# Patient Record
Sex: MALE | Race: OTHER
[De-identification: names, ages, dates, MRNs, and addresses within clinical notes are randomized per-mention and may not be internally consistent; named-entity substitution may affect disease eponyms.]

---

## 2018-02-21 ENCOUNTER — HOSPITAL ENCOUNTER (INPATIENT)
Dept: HOSPITAL 72 - EMR | Age: 73
LOS: 5 days | Discharge: NURSING, REHAB OR LONG-TERM CARE | DRG: 481 | End: 2018-02-26
Payer: MEDICARE

## 2018-02-21 VITALS — WEIGHT: 135 LBS | BODY MASS INDEX: 21.69 KG/M2 | HEIGHT: 66 IN

## 2018-02-21 VITALS — SYSTOLIC BLOOD PRESSURE: 91 MMHG | DIASTOLIC BLOOD PRESSURE: 48 MMHG

## 2018-02-21 VITALS — DIASTOLIC BLOOD PRESSURE: 45 MMHG | SYSTOLIC BLOOD PRESSURE: 100 MMHG

## 2018-02-21 VITALS — SYSTOLIC BLOOD PRESSURE: 94 MMHG | DIASTOLIC BLOOD PRESSURE: 45 MMHG

## 2018-02-21 VITALS — SYSTOLIC BLOOD PRESSURE: 94 MMHG | DIASTOLIC BLOOD PRESSURE: 56 MMHG

## 2018-02-21 VITALS — SYSTOLIC BLOOD PRESSURE: 113 MMHG | DIASTOLIC BLOOD PRESSURE: 71 MMHG

## 2018-02-21 VITALS — DIASTOLIC BLOOD PRESSURE: 43 MMHG | SYSTOLIC BLOOD PRESSURE: 80 MMHG

## 2018-02-21 VITALS — SYSTOLIC BLOOD PRESSURE: 87 MMHG | DIASTOLIC BLOOD PRESSURE: 52 MMHG

## 2018-02-21 DIAGNOSIS — Z72.0: ICD-10-CM

## 2018-02-21 DIAGNOSIS — D69.6: ICD-10-CM
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
## 2018-02-21 DIAGNOSIS — D68.9: ICD-10-CM

## 2018-02-21 DIAGNOSIS — S72.141A: Primary | ICD-10-CM

## 2018-02-21 DIAGNOSIS — I25.10: ICD-10-CM

## 2018-02-21 DIAGNOSIS — I95.9: ICD-10-CM

## 2018-02-21 DIAGNOSIS — E78.5: ICD-10-CM

## 2018-02-21 DIAGNOSIS — E86.0: ICD-10-CM

## 2018-02-21 DIAGNOSIS — R55: ICD-10-CM

## 2018-02-21 DIAGNOSIS — N40.0: ICD-10-CM

## 2018-02-21 DIAGNOSIS — K70.9: ICD-10-CM
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
## 2018-02-21 DIAGNOSIS — I73.9: ICD-10-CM

## 2018-02-21 DIAGNOSIS — D62: ICD-10-CM

## 2018-02-21 DIAGNOSIS — I10: ICD-10-CM

## 2018-02-21 DIAGNOSIS — E87.1: ICD-10-CM

## 2018-02-21 DIAGNOSIS — F10.11: ICD-10-CM

## 2018-02-21 DIAGNOSIS — J44.9: ICD-10-CM

## 2018-02-21 DIAGNOSIS — N17.9: ICD-10-CM

## 2018-02-21 DIAGNOSIS — I48.91: ICD-10-CM

## 2018-02-21 DIAGNOSIS — D64.89: ICD-10-CM

## 2018-02-21 LAB
ADD MANUAL DIFF: NO
ALBUMIN SERPL-MCNC: 3.3 G/DL (ref 3.4–5)
ALBUMIN/GLOB SERPL: 0.7 {RATIO} (ref 1–2.7)
ALP SERPL-CCNC: 114 U/L (ref 46–116)
ALT SERPL-CCNC: 15 U/L (ref 12–78)
ANION GAP SERPL CALC-SCNC: 10 MMOL/L (ref 5–15)
AST SERPL-CCNC: 27 U/L (ref 15–37)
BASOPHILS NFR BLD AUTO: 1.6 % (ref 0–2)
BILIRUB SERPL-MCNC: 0.7 MG/DL (ref 0.2–1)
BUN SERPL-MCNC: 36 MG/DL (ref 7–18)
CALCIUM SERPL-MCNC: 9.5 MG/DL (ref 8.5–10.1)
CHLORIDE SERPL-SCNC: 96 MMOL/L (ref 98–107)
CO2 SERPL-SCNC: 25 MMOL/L (ref 21–32)
CREAT SERPL-MCNC: 1.4 MG/DL (ref 0.55–1.3)
EOSINOPHIL NFR BLD AUTO: 6.7 % (ref 0–3)
ERYTHROCYTE [DISTWIDTH] IN BLOOD BY AUTOMATED COUNT: 12.2 % (ref 11.6–14.8)
GLOBULIN SER-MCNC: 5 G/DL
HCT VFR BLD CALC: 42.3 % (ref 42–52)
HGB BLD-MCNC: 14.8 G/DL (ref 14.2–18)
INR PPP: 1.2 (ref 0.9–1.1)
LYMPHOCYTES NFR BLD AUTO: 28.4 % (ref 20–45)
MCV RBC AUTO: 98 FL (ref 80–99)
MONOCYTES NFR BLD AUTO: 13.2 % (ref 1–10)
NEUTROPHILS NFR BLD AUTO: 50.1 % (ref 45–75)
PLATELET # BLD: 111 K/UL (ref 150–450)
POTASSIUM SERPL-SCNC: 4.6 MMOL/L (ref 3.5–5.1)
RBC # BLD AUTO: 4.32 M/UL (ref 4.7–6.1)
SODIUM SERPL-SCNC: 130 MMOL/L (ref 136–145)
WBC # BLD AUTO: 7 K/UL (ref 4.8–10.8)

## 2018-02-21 PROCEDURE — 85025 COMPLETE CBC W/AUTO DIFF WBC: CPT

## 2018-02-21 PROCEDURE — 85610 PROTHROMBIN TIME: CPT

## 2018-02-21 PROCEDURE — 94150 VITAL CAPACITY TEST: CPT

## 2018-02-21 PROCEDURE — 99285 EMERGENCY DEPT VISIT HI MDM: CPT

## 2018-02-21 PROCEDURE — 94760 N-INVAS EAR/PLS OXIMETRY 1: CPT

## 2018-02-21 PROCEDURE — 86850 RBC ANTIBODY SCREEN: CPT

## 2018-02-21 PROCEDURE — 72170 X-RAY EXAM OF PELVIS: CPT

## 2018-02-21 PROCEDURE — 84100 ASSAY OF PHOSPHORUS: CPT

## 2018-02-21 PROCEDURE — 86901 BLOOD TYPING SEROLOGIC RH(D): CPT

## 2018-02-21 PROCEDURE — 87340 HEPATITIS B SURFACE AG IA: CPT

## 2018-02-21 PROCEDURE — 84443 ASSAY THYROID STIM HORMONE: CPT

## 2018-02-21 PROCEDURE — 86709 HEPATITIS A IGM ANTIBODY: CPT

## 2018-02-21 PROCEDURE — 93880 EXTRACRANIAL BILAT STUDY: CPT

## 2018-02-21 PROCEDURE — 93005 ELECTROCARDIOGRAM TRACING: CPT

## 2018-02-21 PROCEDURE — 86803 HEPATITIS C AB TEST: CPT

## 2018-02-21 PROCEDURE — 71045 X-RAY EXAM CHEST 1 VIEW: CPT

## 2018-02-21 PROCEDURE — 80053 COMPREHEN METABOLIC PANEL: CPT

## 2018-02-21 PROCEDURE — 82607 VITAMIN B-12: CPT

## 2018-02-21 PROCEDURE — 94664 DEMO&/EVAL PT USE INHALER: CPT

## 2018-02-21 PROCEDURE — 82306 VITAMIN D 25 HYDROXY: CPT

## 2018-02-21 PROCEDURE — 83735 ASSAY OF MAGNESIUM: CPT

## 2018-02-21 PROCEDURE — 85007 BL SMEAR W/DIFF WBC COUNT: CPT

## 2018-02-21 PROCEDURE — 76700 US EXAM ABDOM COMPLETE: CPT

## 2018-02-21 PROCEDURE — 80048 BASIC METABOLIC PNL TOTAL CA: CPT

## 2018-02-21 PROCEDURE — 86900 BLOOD TYPING SEROLOGIC ABO: CPT

## 2018-02-21 PROCEDURE — 83880 ASSAY OF NATRIURETIC PEPTIDE: CPT

## 2018-02-21 PROCEDURE — 82746 ASSAY OF FOLIC ACID SERUM: CPT

## 2018-02-21 PROCEDURE — 94640 AIRWAY INHALATION TREATMENT: CPT

## 2018-02-21 PROCEDURE — 85044 MANUAL RETICULOCYTE COUNT: CPT

## 2018-02-21 PROCEDURE — 84484 ASSAY OF TROPONIN QUANT: CPT

## 2018-02-21 PROCEDURE — 86703 HIV-1/HIV-2 1 RESULT ANTBDY: CPT

## 2018-02-21 PROCEDURE — 83550 IRON BINDING TEST: CPT

## 2018-02-21 PROCEDURE — 86920 COMPATIBILITY TEST SPIN: CPT

## 2018-02-21 PROCEDURE — 93306 TTE W/DOPPLER COMPLETE: CPT

## 2018-02-21 PROCEDURE — 94003 VENT MGMT INPAT SUBQ DAY: CPT

## 2018-02-21 PROCEDURE — 83540 ASSAY OF IRON: CPT

## 2018-02-21 PROCEDURE — 87081 CULTURE SCREEN ONLY: CPT

## 2018-02-21 PROCEDURE — 86705 HEP B CORE ANTIBODY IGM: CPT

## 2018-02-21 PROCEDURE — 36415 COLL VENOUS BLD VENIPUNCTURE: CPT

## 2018-02-21 PROCEDURE — 82728 ASSAY OF FERRITIN: CPT

## 2018-02-21 RX ADMIN — TAMSULOSIN HYDROCHLORIDE SCH MG: 0.4 CAPSULE ORAL at 23:32

## 2018-02-21 RX ADMIN — DOCUSATE SODIUM SCH MG: 100 CAPSULE, LIQUID FILLED ORAL at 23:32

## 2018-02-21 NOTE — EMERGENCY ROOM REPORT
History of Present Illness


General


Chief Complaint:  Multiple Trauma/Fall


Source:  Patient, EMS





Present Illness


HPI


72YOM with accidental slip and fall in bathroom at [facility]


Landed on right side


Couldnt move, stand up without assistance


EMS noted abrasions to right arm, leg and deformity to right hip


Allergies:  


Coded Allergies:  


     No Known Allergies (Unverified , 2/21/18)





Review of Systems


All Other Systems:  negative except mentioned in HPI





Physical Exam





Vital Signs








  Date Time  Temp Pulse Resp B/P (MAP) Pulse Ox O2 Delivery O2 Flow Rate FiO2


 


2/21/18 17:39 98.0 80 16 110/70 98 Room Air  





 98.1       








Sp02 EP Interpretation:  reviewed, normal


General Appearance:  normal inspection, well appearing, no apparent distress, 

alert, GCS 15, non-toxic


Head:  normocephalic, atraumatic


Eyes:  bilateral eye PERRL, bilateral eye EOMI


ENT:  normal ENT inspection, hearing grossly normal, normal pharynx, no 

angioedema, normal voice, TMs + canals normal, uvula midline, moist mucus 

membranes


Neck:  normal inspection, full range of motion, supple, thyroid normal, no 

meningismus, no bony tend


Respiratory:  normal inspection, lungs clear, normal breath sounds, no rhonchi, 

no respiratory distress, no retraction, no accessory muscle use, no wheezing, 

speaking full sentences


Cardiovascular #1:  regular rate, rhythm, no edema, no JVD, normal capillary 

refill


Gastrointestinal:  normal inspection, normal bowel sounds, non tender, soft, no 

mass, no peritonitis, non-distended, no guarding, no hernia, no pulsatile mass


Genitourinary:  no CVA tenderness


Musculoskeletal:  normal inspection, back normal, normal range of motion, no 

calf tenderness, pelvis stable, Kasia's Sign negative, other - Right hip: 

obvious deformity of proximal hip, shorteend and internally rotated right lower 

extremity


Neurologic:  normal inspection, alert, oriented x3, responsive, CNs III-XII nml 

as tested, motor strength/tone normal, cerebellar normal, normal gait, speech 

normal


Psychiatric:  normal inspection, judgement/insight normal, mood/affect normal, 

no suicidal/homicidal ideation, no delusions


Skin:  other - Abrasions to right elbow and right anterior leg; multiple areas 

of ecchymoses


Lymphatic:  normal inspection, no adenopathy





Procedures


Ultrasound


Ultrasound :  


   Consent:  Verbal


   Patient Tolerated:  Well


   Complications:  None


Progress


Ultrasound guided right femoral nerve block done with 8ml Buvipicaine and 2ml 

lidocaine





Medical Decision Making


Diagnostic Impression:  


 Primary Impression:  


 Fall


 Qualified Codes:  W19.XXXA - Unspecified fall, initial encounter


 Additional Impressions:  


 Right hip pain


 Closed right hip fracture


 Qualified Codes:  S72.001A - Fracture of unspecified part of neck of right 

femur, initial encounter for closed fracture


 SHADY (acute kidney injury)


ER Course


Right hip pain found to have right hip fx on ED review of films


Oral analgesia and right femoral nerve block provided in ED


ECG non-ischemic, Trop 0.


Dr Jenkins consulted


PCP is Dr Ziegler, endorsed to Dr Lira at 740pm for tele bed


Also has ?SHADY on labs, unknown if acute or chronic - possibly acute given mild 

hypotension, hypoNa in ED. Responded to LR.


EKG Diagnostic Results


Rate:  normal


Rhythm:  NSR


ST Segments:  no acute changes


ASA given to the pt in ED:  No





Rhythm Strip Diag. Results


EP Interpretation:  yes


Rate:  65


Rhythm:  NSR, no PVC's, no ectopy





Other X-Ray Diagnostic Results


Other X-Ray Diagnostic Results #1:  


   X-Ray ordered:  AP pelvis


   # of Views/Limited Vs Complete:  1 View


   Indication:  Pain


   Interpretation:  other


   Impression:  Other - Right hip fx


   Electronically Signed by:  Dr Israel Marsh MD


Other X-Ray Diagnostic Results #2:  


   X-Ray ordered:  Right hip


   # of Views/Limited Vs Complete:  3 View


   Indication:  Pain


   Interpretation:  other - Right hip fx


   Electronically Signed by:  Dr Israel Marsh MD





Last Vital Signs








  Date Time  Temp Pulse Resp B/P (MAP) Pulse Ox O2 Delivery O2 Flow Rate FiO2


 


2/21/18 17:39 98.0 80 16 110/70 98 Room Air  





 98.1       








Status:  improved


Disposition:  ADMITTED AS INPATIENT


Condition:  Serious











ISRAEL MARSH M.D. Feb 21, 2018 17:49

## 2018-02-21 NOTE — HISTORY AND PHYSICAL
History of Present Illness


General


Date patient seen:  Feb 21, 2018


Time patient seen:  19:30


Reason for Hospitalization:  R hip fracture, syncope





Present Illness


HPI


71y/o male with pmh of CAD, HTN, PVD, COPD, BPH, tobacco abuse who presents 

with R hip pain after a fall. Pt states he was in bathroom and felt lightheaded 

and the room spinning. He then fell to the floor and had brief LOC. He states 

when he awoke he had full recollection of events. He landed on R side. He couldn

't move or stand up w/o assistance. Denies f/c, n/v, d/c, chest pain, SOB. Pt 

states he can walk several blocks and at least a flight of stairs. He walks on 

own and sometimes uses a cane. Activity limited by leg pain/weakness. 





In ED, pt found to have R hip fracture.


Allergies:  


Coded Allergies:  


     No Known Allergies (Unverified , 2/21/18)





Medication History


Scheduled


Albuterol Sulfate (Ventolin Hfa), 1 PUFF INH EVERY 6 HOURS, (Reported)


Albuterol Sulfate* (Proair Hfa*), 1 PUFF INH Q6H, (Reported)


Aspirin* (Aspir 81*), 81 MG ORAL DAILY, (Reported)


Finasteride* (Proscar*), 5 MG ORAL DAILY, (Reported)


Folic Acid* (Folic Acid*), 1 MG ORAL DAILY, (Reported)


Furosemide* (Lasix*), 80 MG ORAL BID, (Reported)


Pantoprazole* (Pantoprazole*), 40 MG ORAL DAILY, (Reported)


Spironolactone* (Spironolactone*), 100 MG ORAL DAILY, (Reported)


Tamsulosin Hcl (Tamsulosin Hcl*), 0.4 MG ORAL BEDTIME, (Reported)


Trazodone Hcl* (Desyrel*), 50 MG ORAL BEDTIME, (Reported)





Scheduled PRN


Acetaminophen* (Tylenol Extra Strength*), 500 MG ORAL Q6H PRN for Mild Pain/

Temp > 100.5, (Reported)





Miscellaneous Medications


Lactulose (Lactulose), 10 GM PO, (Reported)





Patient History


History Provided By:  Patient, Medical Record, PMD


Healthcare decision maker





Resuscitation status





Advanced Directive on File








Past Medical/Surgical History


Past Medical/Surgical History:  


(1) CAD (coronary artery disease)


(2) HTN (hypertension)


(3) HLD (hyperlipidemia)


(4) BPH (benign prostatic hyperplasia)


(5) COPD (chronic obstructive pulmonary disease)


(6) Tobacco abuse


(7) PVD (peripheral vascular disease)





Family History


Family History:  


Patient reports no known family medical history.





Social History


Social History:  


(1) lives in [facility]





Review of Systems


Constitutional:  Reports: malaise, weakness


Eye:  Reports: no symptoms


ENT:  Reports: no symptoms


Respiratory:  Reports: no symptoms


Cardiovascular:  Reports: syncope


Gastrointestinal:  Reports: no symptoms


Genitourinary:  Reports: no symptoms


Musculoskeletal:  Reports: joint pain, muscle pain


Skin:  Reports: no symptoms


Psychiatric:  Reports: no symptoms


Neurological:  Reports: no symptoms


Endocrine:  Reports: no symptoms


Hematologic/Lymphatic:  Reports: no symptoms





Physical Exam


Physical Exam Narrative


General: alert, cooperative, no distress, appears stated age


Head: normocephalic, without obvious abnormality, atraumatic


Eyes: conjunctivae/corneas clear. PERRL, EOM's intact


Throat: lips, mucosa, and tongue normal. Dry MM


Neck: supple, symmetrical, trachea midline, and no JVD


Lungs: clear to auscultation bilaterally


Heart: regular rate and rhythm, S1, S2 normal, no murmur, click, rub or gallop


Abdomen: soft, non-tender, non-distended, bowel sounds normal


Extremities: +R hip TTP w/ ROM limited 2/2 pain, no cyanosis or edema


Pulses: 2+ and symmetric


Skin: skin color, texture, turgor normal; +abrasions on extremities


Neurologic: grossly normal, no focal deficits





Last 24 Hour Vital Signs








  Date Time  Temp Pulse Resp B/P (MAP) Pulse Ox O2 Delivery O2 Flow Rate FiO2


 


2/21/18 19:54 97.5 74 16 113/71 96 Room Air  





 97.5       


 


2/21/18 19:40 97.5 71 16 100/45 96 Room Air  





 97.5       


 


2/21/18 19:29 97.5 77 16 87/52 96 Room Air  





 97.5       


 


2/21/18 17:48 97.1 71 19 94/56 98 Room Air  





 97.1       


 


2/21/18 17:39 98.0 80 16 110/70 98 Room Air  





 98.1       











Laboratory Tests








Test


  2/21/18


18:00


 


White Blood Count


  7.0 K/UL


(4.8-10.8)


 


Red Blood Count


  4.32 M/UL


(4.70-6.10)  L


 


Hemoglobin


  14.8 G/DL


(14.2-18.0)


 


Hematocrit


  42.3 %


(42.0-52.0)


 


Mean Corpuscular Volume 98 FL (80-99)  


 


Mean Corpuscular Hemoglobin


  34.2 PG


(27.0-31.0)  H


 


Mean Corpuscular Hemoglobin


Concent 34.9 G/DL


(32.0-36.0)


 


Red Cell Distribution Width


  12.2 %


(11.6-14.8)


 


Platelet Count


  111 K/UL


(150-450)  L


 


Mean Platelet Volume


  6.0 FL


(6.5-10.1)  L


 


Neutrophils (%) (Auto)


  50.1 %


(45.0-75.0)


 


Lymphocytes (%) (Auto)


  28.4 %


(20.0-45.0)


 


Monocytes (%) (Auto)


  13.2 %


(1.0-10.0)  H


 


Eosinophils (%) (Auto)


  6.7 %


(0.0-3.0)  H


 


Basophils (%) (Auto)


  1.6 %


(0.0-2.0)


 


Prothrombin Time


  12.3 SEC


(9.30-11.50)  H


 


Prothromb Time International


Ratio 1.2 (0.9-1.1)


H


 


Sodium Level


  130 MMOL/L


(136-145)  L


 


Potassium Level


  4.6 MMOL/L


(3.5-5.1)


 


Chloride Level


  96 MMOL/L


()  L


 


Carbon Dioxide Level


  25 MMOL/L


(21-32)


 


Anion Gap


  10 mmol/L


(5-15)


 


Blood Urea Nitrogen


  36 mg/dL


(7-18)  H


 


Creatinine


  1.4 MG/DL


(0.55-1.30)  H


 


Estimat Glomerular Filtration


Rate  mL/min (>60)  


 


 


Glucose Level


  129 MG/DL


()  H


 


Calcium Level


  9.5 MG/DL


(8.5-10.1)


 


Total Bilirubin


  0.7 MG/DL


(0.2-1.0)


 


Aspartate Amino Transf


(AST/SGOT) 27 U/L (15-37)


 


 


Alanine Aminotransferase


(ALT/SGPT) 15 U/L (12-78)


 


 


Alkaline Phosphatase


  114 U/L


()


 


Troponin I


  0.000 ng/mL


(0.000-0.056)


 


Total Protein


  8.3 G/DL


(6.4-8.2)  H


 


Albumin


  3.3 G/DL


(3.4-5.0)  L


 


Globulin 5.0 g/dL  


 


Albumin/Globulin Ratio


  0.7 (1.0-2.7)


L








Height (Feet):  5


Height (Inches):  6.00


Weight (Pounds):  135


Medications





Current Medications








 Medications


  (Trade)  Dose


 Ordered  Sig/Marleni


 Route


 PRN Reason  Start Time


 Stop Time Status Last Admin


Dose Admin


 


 Acetaminophen


  (Tylenol)  650 mg  Q4H  PRN


 ORAL


 Mild Pain (Pain Scale 1-3)  2/21/18 19:00


 3/23/18 18:59   


 


 


 Acetaminophen


  (Tylenol)  650 mg  Q4H  PRN


 ORAL


 T>100.5  2/21/18 19:00


 3/23/18 18:59   


 


 


 Acetaminophen


  (Tylenol)  650 mg  Q4H  PRN


 RECTAL


 Mild Pain (Pain Scale 1-3)  2/21/18 19:00


 3/23/18 18:59   


 


 


 Acetaminophen


  (Tylenol)  650 mg  Q4H  PRN


 RECTAL


 T>100.5  2/21/18 19:00


 3/23/18 18:59   


 


 


 Aspirin


  (Ecotrin)  81 mg  DAILY


 ORAL


   2/22/18 09:00


 3/24/18 08:59   


 


 


 Bisacodyl


  (Dulcolax)  10 mg  HSPRN  PRN


 RECTAL


 Constipation  2/21/18 19:00


 3/23/18 18:59   


 


 


 Dextrose


  (Dextrose 50%)    STAT  PRN


 IV


 Hypoglycemia  2/21/18 19:00


 3/23/18 18:59   


 


 


 Diphenhydramine


 HCl


  (Benadryl)  25 mg  Q6H  PRN


 ORAL


 Itching/Pruritis  2/21/18 19:00


 3/23/18 18:59   


 


 


 Docusate Sodium


  (Colace)  100 mg  EVERY 12  HOURS


 ORAL


   2/21/18 21:00


 3/23/18 20:59   


 


 


 Folic Acid


  (Folate)  1 mg  DAILY


 ORAL


   2/22/18 09:00


 3/24/18 08:59   


 


 


 Morphine Sulfate


  (Morphine


 Sulfate)  4 mg  Q4H  PRN


 IVP


 breakthrough pain  2/21/18 19:00


 2/28/18 18:59   


 


 


 Ondansetron HCl


  (Zofran)  4 mg  Q6H  PRN


 IVP


 Nausea & Vomiting  2/21/18 19:00


 3/23/18 18:59   


 


 


 Oxycodone/


 Acetaminophen


  (Percocet 10/325)  1 tab  Q4H  PRN


 ORAL


 Severe Pain (Pain Scale 7-10)  2/21/18 19:00


 2/28/18 18:59   


 


 


 Oxycodone/


 Acetaminophen


  (Percocet 5-325)  1 tab  Q4H  PRN


 ORAL


 Moderate Pain (Pain Scale 4-6)  2/21/18 19:00


 2/28/18 18:59   


 


 


 Pantoprazole


  (Protonix)  40 mg  DAILY


 ORAL


   2/22/18 09:00


 3/24/18 08:59   


 


 


 Polyethylene


 Glycol


  (Miralax)  17 gm  HSPRN  PRN


 ORAL


 Constipation  2/21/18 19:00


 3/23/18 18:59   


 


 


 Sodium Chloride  1,000 ml @ 


 75 mls/hr  [id number]


 IV


   2/21/18 19:00


 3/23/18 18:59   


 


 


 Tamsulosin HCl


  (Flomax)  0.4 mg  BEDTIME


 ORAL


   2/21/18 21:00


 3/23/18 20:59   


 


 


 Trazodone HCl


  (Desyrel)  50 mg  HSPRN  PRN


 ORAL


 insomnia  2/21/18 19:00


 3/23/18 18:59   


 











Assessment/Plan


Problem List:  


(1) Fracture of right hip


ICD Codes:  S72.001A - Fracture of unspecified part of neck of right femur, 

initial encounter for closed fracture


SNOMED:  346140465


(2) Syncope


ICD Codes:  R55 - Syncope and collapse


SNOMED:  779311294


(3) SHADY (acute kidney injury)


ICD Codes:  N17.9 - Acute kidney failure, unspecified


SNOMED:  20312854


(4) Hyponatremia


ICD Codes:  E87.1 - Hypo-osmolality and hyponatremia


SNOMED:  99511121


(5) CAD (coronary artery disease)


ICD Codes:  I25.10 - Atherosclerotic heart disease of native coronary artery 

without angina pectoris


SNOMED:  20935238


(6) COPD (chronic obstructive pulmonary disease)


ICD Codes:  J44.9 - Chronic obstructive pulmonary disease, unspecified


SNOMED:  82684650


(7) HTN (hypertension)


ICD Codes:  I10 - Essential (primary) hypertension


SNOMED:  27289273


(8) HLD (hyperlipidemia)


ICD Codes:  E78.5 - Hyperlipidemia, unspecified


SNOMED:  80801183


(9) BPH (benign prostatic hyperplasia)


ICD Codes:  N40.0 - Benign prostatic hyperplasia without lower urinary tract 

symptoms


SNOMED:  744984567


(10) Tobacco abuse


ICD Codes:  Z72.0 - Tobacco use


SNOMED:  747967004


Status:  stable


Assessment/Plan


Labs with evidence of dehydration. Syncope may be 2/2 orthostatic hypotension





Admit to tele


Ortho consulted


NWB RLE


Likely plan for OR for ORIF per ortho in 1-2 days


Pain control, bowel regimen


Cardiology consulted


Trend trop/EKG


Check TTE


Check Carotid duplex


IVFs for SHADY


Cont home meds but hold home lasix and aldactone for now





DVT Prophylaxis:   SCD


Code Status:            Full


Hospital Classification Declaration: Based on this initial evaluation, and 

depending on the patient's clinical course, I anticipate that this patient will 

require hospitalization for 2-3 days for R hip fracture, syncope, SHADY and close 

respiratory/hemodynamic monitoring.





Disposition: Once the patient is stable to leave the hospital, I anticipate the 

patient will likely be discharged to the following environment: back to [facility] vs 

SNF





I spent 70 minutes on this patient's case, and >50% was dedicated to counseling 

and/or care coordination. Discussed with patient/family, nursing staff, SW/CM, 

ER physician, cardiology regarding clinical status, treatment course, and 

disposition planning.





Time of note may not reflect time of encounter.











Pankaj Solo M.D. Feb 21, 2018 22:35

## 2018-02-22 VITALS — DIASTOLIC BLOOD PRESSURE: 54 MMHG | SYSTOLIC BLOOD PRESSURE: 94 MMHG

## 2018-02-22 VITALS — DIASTOLIC BLOOD PRESSURE: 60 MMHG | SYSTOLIC BLOOD PRESSURE: 103 MMHG

## 2018-02-22 VITALS — DIASTOLIC BLOOD PRESSURE: 50 MMHG | SYSTOLIC BLOOD PRESSURE: 95 MMHG

## 2018-02-22 VITALS — SYSTOLIC BLOOD PRESSURE: 120 MMHG | DIASTOLIC BLOOD PRESSURE: 47 MMHG

## 2018-02-22 VITALS — SYSTOLIC BLOOD PRESSURE: 120 MMHG | DIASTOLIC BLOOD PRESSURE: 78 MMHG

## 2018-02-22 VITALS — SYSTOLIC BLOOD PRESSURE: 93 MMHG | DIASTOLIC BLOOD PRESSURE: 55 MMHG

## 2018-02-22 LAB
ADD MANUAL DIFF: NO
ANION GAP SERPL CALC-SCNC: 7 MMOL/L (ref 5–15)
BASOPHILS NFR BLD AUTO: 0.8 % (ref 0–2)
BUN SERPL-MCNC: 37 MG/DL (ref 7–18)
CALCIUM SERPL-MCNC: 8.8 MG/DL (ref 8.5–10.1)
CHLORIDE SERPL-SCNC: 101 MMOL/L (ref 98–107)
CO2 SERPL-SCNC: 25 MMOL/L (ref 21–32)
CREAT SERPL-MCNC: 1.3 MG/DL (ref 0.55–1.3)
EOSINOPHIL NFR BLD AUTO: 1.7 % (ref 0–3)
ERYTHROCYTE [DISTWIDTH] IN BLOOD BY AUTOMATED COUNT: 12.3 % (ref 11.6–14.8)
HCT VFR BLD CALC: 32.2 % (ref 42–52)
HGB BLD-MCNC: 11.4 G/DL (ref 14.2–18)
LYMPHOCYTES NFR BLD AUTO: 24.8 % (ref 20–45)
MCV RBC AUTO: 98 FL (ref 80–99)
MONOCYTES NFR BLD AUTO: 15.5 % (ref 1–10)
NEUTROPHILS NFR BLD AUTO: 57.1 % (ref 45–75)
PLATELET # BLD: 100 K/UL (ref 150–450)
POTASSIUM SERPL-SCNC: 4.8 MMOL/L (ref 3.5–5.1)
RBC # BLD AUTO: 3.28 M/UL (ref 4.7–6.1)
SODIUM SERPL-SCNC: 133 MMOL/L (ref 136–145)
WBC # BLD AUTO: 8.4 K/UL (ref 4.8–10.8)

## 2018-02-22 RX ADMIN — IPRATROPIUM BROMIDE AND ALBUTEROL SULFATE SCH ML: .5; 3 SOLUTION RESPIRATORY (INHALATION) at 19:35

## 2018-02-22 RX ADMIN — DOCUSATE SODIUM SCH MG: 100 CAPSULE, LIQUID FILLED ORAL at 08:48

## 2018-02-22 RX ADMIN — ASPIRIN SCH MG: 81 TABLET, DELAYED RELEASE ORAL at 08:48

## 2018-02-22 RX ADMIN — DOCUSATE SODIUM SCH MG: 100 CAPSULE, LIQUID FILLED ORAL at 21:52

## 2018-02-22 RX ADMIN — TAMSULOSIN HYDROCHLORIDE SCH MG: 0.4 CAPSULE ORAL at 21:53

## 2018-02-22 RX ADMIN — MORPHINE SULFATE PRN MG: 4 INJECTION INTRAVENOUS at 21:52

## 2018-02-22 RX ADMIN — IPRATROPIUM BROMIDE AND ALBUTEROL SULFATE SCH ML: .5; 3 SOLUTION RESPIRATORY (INHALATION) at 13:30

## 2018-02-22 NOTE — GENERAL PROGRESS NOTE
Assessment/Plan


Problem List:  


(1) Fracture of right hip


ICD Codes:  S72.001A - Fracture of unspecified part of neck of right femur, 

initial encounter for closed fracture


SNOMED:  069118004


(2) Syncope


ICD Codes:  R55 - Syncope and collapse


SNOMED:  449439534


(3) SHADY (acute kidney injury)


ICD Codes:  N17.9 - Acute kidney failure, unspecified


SNOMED:  33657327


(4) Hyponatremia


ICD Codes:  E87.1 - Hypo-osmolality and hyponatremia


SNOMED:  45484466


(5) CAD (coronary artery disease)


ICD Codes:  I25.10 - Atherosclerotic heart disease of native coronary artery 

without angina pectoris


SNOMED:  51767907


(6) COPD (chronic obstructive pulmonary disease)


ICD Codes:  J44.9 - Chronic obstructive pulmonary disease, unspecified


SNOMED:  78812796


(7) HTN (hypertension)


ICD Codes:  I10 - Essential (primary) hypertension


SNOMED:  64515957


(8) HLD (hyperlipidemia)


ICD Codes:  E78.5 - Hyperlipidemia, unspecified


SNOMED:  73252479


(9) BPH (benign prostatic hyperplasia)


ICD Codes:  N40.0 - Benign prostatic hyperplasia without lower urinary tract 

symptoms


SNOMED:  162869139


(10) Tobacco abuse


ICD Codes:  Z72.0 - Tobacco use


SNOMED:  447196172


(11) PVD (peripheral vascular disease)


ICD Codes:  I73.9 - Peripheral vascular disease, unspecified


SNOMED:  869087022


(12) Alcoholic liver disease


Assessment & Plan:  Cirrhosis


ICD Codes:  K70.9 - Alcoholic liver disease, unspecified


SNOMED:  94534343


Status:  stable


Status Narrative


Labs with evidence of dehydration. Syncope may be 2/2 orthostatic hypotension





Monitor on tele


Ortho consulted


NWB RLE


Plan for ORIF tomorrow, NPO at [facility]


Pain control, bowel regimen


Cardiology consulted


Trop neg 


Check TTE--EF wnl


Check Carotid duplex


IVFs for SHADY


Cont home meds but hold home lasix and aldactone for now





DVT Prophylaxis:   SCD


Code Status:            Full


Hospital Classification Declaration: Based on this initial evaluation, and 

depending on the patient's clinical course, I anticipate that this patient will 

require hospitalization for 2-3 days for R hip fracture, syncope, SHADY and close 

respiratory/hemodynamic monitoring.





Disposition: Once the patient is stable to leave the hospital, I anticipate the 

patient will likely be discharged to the following environment: back to senior living vs 

SNF


Discussed with patient/family, nursing staff, SW/CM, ER physician, cardiology 

regarding clinical status, treatment course, and disposition planning.





Time of note may not reflect time of encounter.





Subjective


Date patient seen:  Feb 22, 2018


Time patient seen:  11:00


ROS Limited/Unobtainable:  No


Constitutional:  Reports: no symptoms


HEENT:  Reports: no symptoms


Cardiovascular:  Reports: no symptoms


Respiratory:  Reports: no symptoms


Gastrointestinal/Abdominal:  Reports: no symptoms


Genitourinary:  Reports: no symptoms


Neurologic/Psychiatric:  Reports: no symptoms


Endocrine:  Reports: no symptoms


Hematologic/Lymphatic:  Reports: no symptoms


Allergies:  


Coded Allergies:  


     No Known Allergies (Unverified , 2/21/18)


Subjective


No acute o/n events


Awaiting OR for ORIF per ortho





Pain controlled. Denies f/c, n/v, d/c, chest pain, SOB, abd pain





Objective





Last 24 Hour Vital Signs








  Date Time  Temp Pulse Resp B/P (MAP) Pulse Ox O2 Delivery O2 Flow Rate FiO2


 


2/22/18 22:22 97.7       


 


2/22/18 21:52 97.7       


 


2/22/18 20:00 97.7 79 24 93/55 98 Nasal Cannula  





 97.7       


 


2/22/18 19:48  78 18  94 Nasal Cannula 3.0 32


 


2/22/18 19:39      Nasal Cannula 2.0 28


 


2/22/18 19:39  74 18  92 Nasal Cannula 2.0 28


 


2/22/18 19:37     93 Nasal Cannula 2.0 28


 


2/22/18 17:07 97.0       


 


2/22/18 16:08 97.0       


 


2/22/18 16:00 98.1 76 18 94/54 97 Nasal Cannula  





 98.1       


 


2/22/18 16:00  70      


 


2/22/18 15:40  78 22  94 Nasal Cannula 2.0 28


 


2/22/18 15:30  75 22   Nasal Cannula 3.0 32


 


2/22/18 15:30  75 22  98 Nasal Cannula 3.0 32


 


2/22/18 12:00 97.0 71 18 103/60 97 Nasal Cannula  





 97.0       


 


2/22/18 12:00  80      


 


2/22/18 10:00      Nasal Cannula 2.0 28


 


2/22/18 10:00     94 Nasal Cannula 2.0 28


 


2/22/18 08:53 98.2       


 


2/22/18 08:13 98.2 75 17 120/78 94 Nasal Cannula  





 98.2       


 


2/22/18 08:00  86      


 


2/22/18 04:00 98.3 75 21 120/47 94 Nasal Cannula  





 98.3       


 


2/22/18 03:53  84      


 


2/22/18 00:00 97.3 74 23 95/50 95 Nasal Cannula  





 97.3       


 


2/21/18 23:56  71      

















Intake and Output  


 


 2/21/18 2/22/18





 19:00 07:00


 


Intake Total  555 ml


 


Output Total  200 ml


 


Balance  355 ml


 


  


 


Intake Oral  0 ml


 


IV Total  555 ml


 


Output Urine Total  200 ml








Laboratory Tests


2/22/18 06:05: 


White Blood Count 8.4, Red Blood Count 3.28L, Hemoglobin 11.4L, Hematocrit 32.2L

, Mean Corpuscular Volume 98, Mean Corpuscular Hemoglobin 34.7H, Mean 

Corpuscular Hemoglobin Concent 35.3, Red Cell Distribution Width 12.3, Platelet 

Count 100L, Mean Platelet Volume 5.9L, Neutrophils (%) (Auto) 57.1, Lymphocytes 

(%) (Auto) 24.8, Monocytes (%) (Auto) 15.5H, Eosinophils (%) (Auto) 1.7, 

Basophils (%) (Auto) 0.8, Sodium Level 133L, Potassium Level 4.8, Chloride 

Level 101, Carbon Dioxide Level 25, Anion Gap 7, Blood Urea Nitrogen 37H, 

Creatinine 1.3, Estimat Glomerular Filtration Rate , Glucose Level 109H, 

Calcium Level 8.8, Troponin I 0.000, Pro-B-Type Natriuretic Peptide 121, 

Vitamin B12 Level 441, Vitamin D 25-Hydroxy [Pending], 25-Hydroxy Vitamin D2 [

Pending], 25-Hydroxy Vitamin D3 [Pending], Folate 63.6H, Thyroid Stimulating 

Hormone (TSH) 2.306


2/22/18 12:30: Troponin I 0.004


Height (Feet):  5


Height (Inches):  6.00


Weight (Pounds):  135


Objective


General: alert, cooperative, no distress, appears stated age


Head: normocephalic, without obvious abnormality, atraumatic


Eyes: conjunctivae/corneas clear. PERRL, EOM's intact


Throat: lips, mucosa, and tongue normal. Dry MM


Neck: supple, symmetrical, trachea midline, and no JVD


Lungs: clear to auscultation bilaterally


Heart: regular rate and rhythm, S1, S2 normal, no murmur, click, rub or gallop


Abdomen: soft, non-tender, non-distended, bowel sounds normal


Extremities: +R hip TTP w/ ROM limited 2/2 pain, no cyanosis or edema


Pulses: 2+ and symmetric


Skin: skin color, texture, turgor normal; +abrasions on extremities


Neurologic: grossly normal, no focal deficits











Pankaj Solo M.D. Feb 22, 2018 23:01

## 2018-02-22 NOTE — DIAGNOSTIC IMAGING REPORT
Indication: Chest pain

 

Technique: One view of the chest

 

Comparison:

 

Findings: Atelectatic changes are seen in the right lung base. Lungs and pleural

spaces are otherwise clear. Heart size is normal. The aorta is elongated and

calcified.

 

Impression: Basilar atelectasis. No acute process otherwise

## 2018-02-22 NOTE — CARDIAC ELECTROPHYSIOLOGY PN
Subjective


Subjective


[id number]





Objective





Last 24 Hour Vital Signs








  Date Time  Temp Pulse Resp B/P (MAP) Pulse Ox O2 Delivery O2 Flow Rate FiO2


 


2/22/18 16:08 97.0       


 


2/22/18 15:40  78 22  94 Nasal Cannula 2.0 28


 


2/22/18 15:30  75 22   Nasal Cannula 3.0 32


 


2/22/18 15:30  75 22  98 Nasal Cannula 3.0 32


 


2/22/18 12:00 97.0 71 18 103/60 97 Nasal Cannula  





 97.0       


 


2/22/18 12:00  80      


 


2/22/18 10:00      Nasal Cannula 2.0 28


 


2/22/18 10:00     94 Nasal Cannula 2.0 28


 


2/22/18 09:52 98.2       


 


2/22/18 08:53 98.2       


 


2/22/18 08:13 98.2 75 17 120/78 94 Nasal Cannula  





 98.2       


 


2/22/18 08:00  86      


 


2/22/18 04:00 98.3 75 21 120/47 94 Nasal Cannula  





 98.3       


 


2/22/18 03:53  84      


 


2/22/18 00:00 97.3 74 23 95/50 95 Nasal Cannula  





 97.3       


 


2/21/18 23:56  71      


 


2/21/18 21:35 97.5 74 16 91/48 96 Room Air  





 97.5       


 


2/21/18 21:30 97.5 74 16 91/48 96 Room Air  





 97.5       


 


2/21/18 21:20 97.5 74 16 80/43 96 Room Air  





 97.5       


 


2/21/18 21:00 97.5 70 16 94/45 96 Room Air  





 97.5       


 


2/21/18 19:54 97.5 74 16 113/71 96 Room Air  





 97.5       


 


2/21/18 19:40 97.5 71 16 100/45 96 Room Air  





 97.5       


 


2/21/18 19:29 97.5 77 16 87/52 96 Room Air  





 97.5       


 


2/21/18 17:48 97.1 71 19 94/56 98 Room Air  





 97.1       


 


2/21/18 17:39 98.0 80 16 110/70 98 Room Air  





 98.1       

















Intake and Output  


 


 2/21/18 2/22/18





 19:00 07:00


 


Intake Total  555 ml


 


Output Total  200 ml


 


Balance  355 ml


 


  


 


Intake Oral  0 ml


 


IV Total  555 ml


 


Output Urine Total  200 ml











Laboratory Tests








Test


  2/21/18


18:00 2/22/18


06:05 2/22/18


12:30


 


White Blood Count


  7.0 K/UL


(4.8-10.8) 8.4 K/UL


(4.8-10.8) 


 


 


Red Blood Count


  4.32 M/UL


(4.70-6.10)  L 3.28 M/UL


(4.70-6.10)  L 


 


 


Hemoglobin


  14.8 G/DL


(14.2-18.0) 11.4 G/DL


(14.2-18.0)  L 


 


 


Hematocrit


  42.3 %


(42.0-52.0) 32.2 %


(42.0-52.0)  L 


 


 


Mean Corpuscular Volume 98 FL (80-99)   98 FL (80-99)   


 


Mean Corpuscular Hemoglobin


  34.2 PG


(27.0-31.0)  H 34.7 PG


(27.0-31.0)  H 


 


 


Mean Corpuscular Hemoglobin


Concent 34.9 G/DL


(32.0-36.0) 35.3 G/DL


(32.0-36.0) 


 


 


Red Cell Distribution Width


  12.2 %


(11.6-14.8) 12.3 %


(11.6-14.8) 


 


 


Platelet Count


  111 K/UL


(150-450)  L 100 K/UL


(150-450)  L 


 


 


Mean Platelet Volume


  6.0 FL


(6.5-10.1)  L 5.9 FL


(6.5-10.1)  L 


 


 


Neutrophils (%) (Auto)


  50.1 %


(45.0-75.0) 57.1 %


(45.0-75.0) 


 


 


Lymphocytes (%) (Auto)


  28.4 %


(20.0-45.0) 24.8 %


(20.0-45.0) 


 


 


Monocytes (%) (Auto)


  13.2 %


(1.0-10.0)  H 15.5 %


(1.0-10.0)  H 


 


 


Eosinophils (%) (Auto)


  6.7 %


(0.0-3.0)  H 1.7 %


(0.0-3.0) 


 


 


Basophils (%) (Auto)


  1.6 %


(0.0-2.0) 0.8 %


(0.0-2.0) 


 


 


Prothrombin Time


  12.3 SEC


(9.30-11.50)  H 


  


 


 


Prothromb Time International


Ratio 1.2 (0.9-1.1)


H 


  


 


 


Sodium Level


  130 MMOL/L


(136-145)  L 133 MMOL/L


(136-145)  L 


 


 


Potassium Level


  4.6 MMOL/L


(3.5-5.1) 4.8 MMOL/L


(3.5-5.1) 


 


 


Chloride Level


  96 MMOL/L


()  L 101 MMOL/L


() 


 


 


Carbon Dioxide Level


  25 MMOL/L


(21-32) 25 MMOL/L


(21-32) 


 


 


Anion Gap


  10 mmol/L


(5-15) 7 mmol/L


(5-15) 


 


 


Blood Urea Nitrogen


  36 mg/dL


(7-18)  H 37 mg/dL


(7-18)  H 


 


 


Creatinine


  1.4 MG/DL


(0.55-1.30)  H 1.3 MG/DL


(0.55-1.30) 


 


 


Estimat Glomerular Filtration


Rate  mL/min (>60)  


   mL/min (>60)  


  


 


 


Glucose Level


  129 MG/DL


()  H 109 MG/DL


()  H 


 


 


Calcium Level


  9.5 MG/DL


(8.5-10.1) 8.8 MG/DL


(8.5-10.1) 


 


 


Total Bilirubin


  0.7 MG/DL


(0.2-1.0) 


  


 


 


Aspartate Amino Transf


(AST/SGOT) 27 U/L (15-37)


  


  


 


 


Alanine Aminotransferase


(ALT/SGPT) 15 U/L (12-78)


  


  


 


 


Alkaline Phosphatase


  114 U/L


() 


  


 


 


Troponin I


  0.000 ng/mL


(0.000-0.056) 0.000 ng/mL


(0.000-0.056) 0.004 ng/mL


(0.000-0.056)


 


Total Protein


  8.3 G/DL


(6.4-8.2)  H 


  


 


 


Albumin


  3.3 G/DL


(3.4-5.0)  L 


  


 


 


Globulin 5.0 g/dL    


 


Albumin/Globulin Ratio


  0.7 (1.0-2.7)


L 


  


 


 


Pro-B-Type Natriuretic Peptide


  


  121 pg/mL


(0-125) 


 


 


Vitamin B12 Level


  


  441 PG/ML


(193-986) 


 


 


Vitamin D 25-Hydroxy  Pending   


 


25-Hydroxy Vitamin D2  Pending   


 


25-Hydroxy Vitamin D3  Pending   


 


Folate


  


  63.6 NG/ML


(8.6-58.9)  H 


 


 


Thyroid Stimulating Hormone


(TSH) 


  2.306 uiU/mL


(0.358-3.740) 


 

















NOHELIA OREILLY Feb 22, 2018 16:54

## 2018-02-22 NOTE — CONSULTATION
DATE OF CONSULTATION:  02/22/2018



ORTHOPEDIC CONSULTATION



CONSULTING PHYSICIAN:  Mg Charles M.D.



REQUESTING PHYSICIAN:  Pat Ziegler M.D.



REASON FOR CONSULTATION:  Right-sided hip fracture.



BRIEF HISTORY:  The patient is a pleasant 72-year-old gentleman who lives

in a board and care facility, ambulates using a cane.  He states that 2

weeks ago, he fell and had some bruising in his forehead and in his

forearm, however, he was able to walk around and ambulate without

significant difficulty using a cane, however, last night, he was in the

board and care facility and he went to the bathroom and slipped on the

floor and landed on his right side on the hard tile.  He had immediate

onset of pain about the right hip.  He was unable to ambulate.  He was

brought in to [facility] and x-rays revealed a comminuted

peritrochanteric fracture of the right hip.  Orthopedic consultation was

obtained.  The patient was admitted to Dr. Ziegler.  Generally, the

patient is in reasonable health.



PAST MEDICAL HISTORY:  History of hypertension.



PAST SURGICAL HISTORY:  None available.



MEDICATIONS:  Please see chart.



ALLERGIES:  No known drug allergies.



SOCIAL HISTORY:  He lives in a board and care facility and uses a cane to

get around.  He does not smoke or drink excessively.



REVIEW OF SYSTEMS:  Noncontributory.



PHYSICAL EXAMINATION:  Examination of the right hip reveals right leg is

slightly short and externally rotated.  He has got tenderness over the

right peritrochanteric area.  He has got a lot of bruising in his arm and

his legs due to previous falls.  There is no evidence of a large hematoma

today.



X-RAYS:  X-ray of right hip was reviewed.  There is a peritrochanteric

right hip fracture.



IMPRESSION:  Right hip peritrochanteric fracture.



PLAN:  At this time, I had discussion with the patient.  I explained to him

my findings.  I recommend proceeding with right hip open reduction and

internal fixation with a short gamma nail.  Risks, benefits, and

complications of the surgery were discussed with him.  He understands the

risks of surgery and complications associated with it and will like to go

ahead and proceed with it.  All questions were answered.  We will go ahead

and proceed with surgery tomorrow.  We will have him n.p.o. past midnight.

We will do 2D echo and he will be optimized by Medicine prior to

proceeding with surgery.  All questions were answered.









  ______________________________________________

  Mg Charles M.D.





DR:  Jerry

D:  02/22/2018 07:58

T:  02/22/2018 20:28

JOB#:  [id number]

CC:



MIKE

## 2018-02-22 NOTE — ANETHESIA PREOPERATIVE EVAL
Anesthesia Pre-op PMH/ROS


General


Date of Evaluation:  Feb 22, 2018


Time of Evaluation:  16:42


Anesthesiologist:  Lupe


ASA Score:  ASA 3


Mallampati Score


Class I : Soft palate, uvula, fauces, pillars visible


Class II: Soft palate, uvula, fauces visible


Class III: Soft palate, base of uvula visible


Class IV: Only hard plate visible


Mallampati Classification:  Class III


Surgeon:  Araceli


Diagnosis:  R femoral Fx


Surgical Procedure:  ORIF of R femoral Fx.


Anesthesia History:  none


Social History:  smoking - h/o heavy smoking


Family History:  no anesthesia problems


Allergies:  


Coded Allergies:  


     No Known Allergies (Unverified , 2/21/18)





Past Medical History


Cardiovascular:  Reports: HTN, CAD - no recent CP, other - PVD


Pulmonary:  Reports: COPD, 


   Denies: asthma, BRIAN, other


Gastrointestinal/Genitourinary:  Reports: GERD, 


   Denies: CRI, ESRD, other


Neurologic/Psychiatric:  Reports: depression/anxiety, 


   Denies: dementia, CVA, TIA, other


Endocrine:  Denies: DM, hypothyroidism, steroids, other


HEENT:  Denies: cataract (L), cataract (R), glaucoma, [place] (L), [place] (R), other


Hematology/Immune:  Denies: anemia, DVT, bleeding disorder, other


Musculoskeletal/Integumentary:  Reports: DJD, 


   Denies: OA, RA, DDD, edema, other


Other:  other - malnourished


PMH Narrative:


as above , mechanical fall possible syncope


PSxH Narrative:


see H&P





Anesthesia Pre-op Phys. Exam


Physician Exam





Last Vital Signs








  Date Time  Temp Pulse Resp B/P (MAP) Pulse Ox O2 Delivery O2 Flow Rate FiO2


 


2/22/18 16:08 97.0       


 


2/22/18 15:40  78 22  94 Nasal Cannula 2.0 28


 


2/22/18 12:00    103/60    








Constitutional:  NAD


Neurologic:  CN 2-12 intact


Cardiovascular:  no M/R/G


Respiratory:  other - diffuse wheezing bilaterally


Gastrointestinal:  S/NT/ND





Airway Exam


Mallampati Score:  Class III


MO:  limited


Neck:  stiff


ROM:  limited


Teeth:  missing


Dentures:  no upper, no lower





Anesthesia Pre-op A/P


Labs





Hematology








Test


  2/21/18


18:00 2/22/18


06:05


 


White Blood Count


  7.0 K/UL


(4.8-10.8) 8.4 K/UL


(4.8-10.8)


 


Red Blood Count


  4.32 M/UL


(4.70-6.10)  L 3.28 M/UL


(4.70-6.10)  L


 


Hemoglobin


  14.8 G/DL


(14.2-18.0) 11.4 G/DL


(14.2-18.0)  L


 


Hematocrit


  42.3 %


(42.0-52.0) 32.2 %


(42.0-52.0)  L


 


Mean Corpuscular Volume 98 FL (80-99)   98 FL (80-99)  


 


Mean Corpuscular Hemoglobin


  34.2 PG


(27.0-31.0)  H 34.7 PG


(27.0-31.0)  H


 


Mean Corpuscular Hemoglobin


Concent 34.9 G/DL


(32.0-36.0) 35.3 G/DL


(32.0-36.0)


 


Red Cell Distribution Width


  12.2 %


(11.6-14.8) 12.3 %


(11.6-14.8)


 


Platelet Count


  111 K/UL


(150-450)  L 100 K/UL


(150-450)  L


 


Mean Platelet Volume


  6.0 FL


(6.5-10.1)  L 5.9 FL


(6.5-10.1)  L


 


Neutrophils (%) (Auto)


  50.1 %


(45.0-75.0) 57.1 %


(45.0-75.0)


 


Lymphocytes (%) (Auto)


  28.4 %


(20.0-45.0) 24.8 %


(20.0-45.0)


 


Monocytes (%) (Auto)


  13.2 %


(1.0-10.0)  H 15.5 %


(1.0-10.0)  H


 


Eosinophils (%) (Auto)


  6.7 %


(0.0-3.0)  H 1.7 %


(0.0-3.0)


 


Basophils (%) (Auto)


  1.6 %


(0.0-2.0) 0.8 %


(0.0-2.0)








Coagulation








Test


  2/21/18


18:00


 


Prothrombin Time


  12.3 SEC


(9.30-11.50)  H


 


Prothromb Time International


Ratio 1.2 (0.9-1.1)


H








Chemistry








Test


  2/21/18


18:00 2/22/18


06:05 2/22/18


12:30


 


Sodium Level


  130 MMOL/L


(136-145)  L 133 MMOL/L


(136-145)  L 


 


 


Potassium Level


  4.6 MMOL/L


(3.5-5.1) 4.8 MMOL/L


(3.5-5.1) 


 


 


Chloride Level


  96 MMOL/L


()  L 101 MMOL/L


() 


 


 


Carbon Dioxide Level


  25 MMOL/L


(21-32) 25 MMOL/L


(21-32) 


 


 


Anion Gap


  10 mmol/L


(5-15) 7 mmol/L


(5-15) 


 


 


Blood Urea Nitrogen


  36 mg/dL


(7-18)  H 37 mg/dL


(7-18)  H 


 


 


Creatinine


  1.4 MG/DL


(0.55-1.30)  H 1.3 MG/DL


(0.55-1.30) 


 


 


Estimat Glomerular Filtration


Rate  mL/min (>60)  


   mL/min (>60)  


  


 


 


Glucose Level


  129 MG/DL


()  H 109 MG/DL


()  H 


 


 


Calcium Level


  9.5 MG/DL


(8.5-10.1) 8.8 MG/DL


(8.5-10.1) 


 


 


Total Bilirubin


  0.7 MG/DL


(0.2-1.0) 


  


 


 


Aspartate Amino Transf


(AST/SGOT) 27 U/L (15-37)


  


  


 


 


Alanine Aminotransferase


(ALT/SGPT) 15 U/L (12-78)


  


  


 


 


Alkaline Phosphatase


  114 U/L


() 


  


 


 


Troponin I


  0.000 ng/mL


(0.000-0.056) 0.000 ng/mL


(0.000-0.056) 0.004 ng/mL


(0.000-0.056)


 


Total Protein


  8.3 G/DL


(6.4-8.2)  H 


  


 


 


Albumin


  3.3 G/DL


(3.4-5.0)  L 


  


 


 


Globulin 5.0 g/dL    


 


Albumin/Globulin Ratio


  0.7 (1.0-2.7)


L 


  


 


 


Pro-B-Type Natriuretic Peptide


  


  121 pg/mL


(0-125) 


 


 


Vitamin B12 Level


  


  441 PG/ML


(193-986) 


 


 


Vitamin D 25-Hydroxy  Pending   


 


25-Hydroxy Vitamin D2  Pending   


 


25-Hydroxy Vitamin D3  Pending   


 


Folate


  


  63.6 NG/ML


(8.6-58.9)  H 


 


 


Thyroid Stimulating Hormone


(TSH) 


  2.306 uiU/mL


(0.358-3.740) 


 











Studies


Pre-op Studies:  EKG - NSR





Risk Assessment & Plan


Assessment:


ASA 3


Plan:


SAB vs GA





Pre-Antibiotics


Drug:  as scheduled











MARILYNN ALVAREZ M.D. Feb 22, 2018 16:48

## 2018-02-22 NOTE — CONSULTATION
Consult Note


Consult Note


Patient seen/evaluated.


Right hip fx.


Plan on ORIF tomorrow afternoon.


Discussed plan with the patient.


Full consult dictated.











CRIS EASTMAN Feb 22, 2018 07:49

## 2018-02-22 NOTE — DIAGNOSTIC IMAGING REPORT
Indication: Pain, status post fall

 

Technique: One view of the pelvis, 2 views of the right hip

 

Comparison: none

 

Findings: There is a comminuted angulated intertrochanteric fracture of the right

hip. No definite pelvic fracture demonstrated. The joint spaces are preserved.

 

Impression: Positive for right hip intertrochanteric fracture

 

Electronic medical record indicates that patient has been admitted and orthopedic

consult obtained

## 2018-02-22 NOTE — CONSULTATION
DATE OF CONSULTATION:  02/22/2018



CARDIOLOGY CONSULTATION



CONSULTING PHYSICIAN:  Bill Pollard M.D.



REFERRING PHYSICIAN:  Pat Ziegler M.D.



REASON FOR CONSULTATION:  Management of hypertension and preoperative

clearance prior to right hip surgery.



HISTORY OF PRESENT ILLNESS:  The patient is a 72-year-old gentleman with

history of hypertension, COPD, and benign prostatic hypertrophy as well as

questionable myocardial infarction more than 30 years ago.  The patient

also has history of tobacco abuse, who presented to the emergency room

after a fall and right hip pain.  He was in the bathroom and felt

lightheaded and then room was spinning and then fell on the floor, had a

brief loss of consciousness.  He states that when he woke up, he had full

recollection of the events and landed on the right side, could not move or

stand up.  Denies any chest pain, shortness of breath, nausea, vomiting,

or diaphoresis.  The patient has not had any cardiac history in the last

20 years and has not seen a cardiologist.  In the emergency room, the

patient was found to have right hip fracture and was evaluated by Dr. Charles and scheduled for surgery tomorrow.



PAST MEDICAL HISTORY:  As mentioned above.



MEDICATIONS:  Medications at home include aspirin, folic acid, Lasix,

Aldactone, Flomax, and Ventolin.



FAMILY HISTORY:  Noncontributory.



SOCIAL HISTORY:  He lives at home, occasionally smokes, does not drink

alcohol.



REVIEW OF SYSTEMS:  Review of systems was performed and was negative other

than what was mentioned in the history of present illness.



PHYSICAL EXAMINATION:

VITAL SIGNS:  Showed blood pressure of 110/70, pulse 70, respirations 18,

and he is afebrile.

HEAD AND NECK:  Showed no JVD or carotid bruit.

LUNGS:  Clear.

CARDIOVASCULAR:  Shows regular S1 and S2 with no gallop or murmur.

 

ABDOMEN:  Soft.

EXTREMITIES:  Showed no pitting edema.  Right hip is rotated.



LABORATORY AND DIAGNOSTIC DATA:  His labs show white count of 8.4,

hemoglobin 11.4, hematocrit 32.2, and platelet count of 100.  Sodium 132,

potassium is 4.8, BUN of 37, creatinine 1.3, and glucose of 109.  Troponin

negative x2.  EKG shows sinus rhythm.  No acute ST-T wave abnormality and

poor R-wave progression.



ASSESSMENT AND PLAN:

1. Status post fall versus syncope.  The patient states that he has a

good recollection of the event.  He is already ruled out for myocardial

infarction.  His vital signs have been stable.  We will just get an

echocardiogram to evaluate for ejection fraction and wall motion

abnormality.

2. History of asthma, on albuterol.

3. Right hip fracture.  The patient does not have any chest pain or

shortness of breath.  There is no history of congestive heart failure.

The patient is cleared from cardiac standpoint to undergo the right hip

surgery.



Thank you very much for allowing me to participate in the care of this

patient.  Please do not hesitate to contact me for any questions regarding

my evaluation.









  ______________________________________________

  Bill Pollard M.D.





DR:  ROQUE

D:  02/22/2018 16:54

T:  02/22/2018 22:24

JOB#:  [id number]

CC:

## 2018-02-23 VITALS — SYSTOLIC BLOOD PRESSURE: 85 MMHG | DIASTOLIC BLOOD PRESSURE: 41 MMHG

## 2018-02-23 VITALS — DIASTOLIC BLOOD PRESSURE: 42 MMHG | SYSTOLIC BLOOD PRESSURE: 99 MMHG

## 2018-02-23 VITALS — DIASTOLIC BLOOD PRESSURE: 66 MMHG | SYSTOLIC BLOOD PRESSURE: 118 MMHG

## 2018-02-23 VITALS — DIASTOLIC BLOOD PRESSURE: 46 MMHG | SYSTOLIC BLOOD PRESSURE: 100 MMHG

## 2018-02-23 VITALS — DIASTOLIC BLOOD PRESSURE: 47 MMHG | SYSTOLIC BLOOD PRESSURE: 101 MMHG

## 2018-02-23 VITALS — DIASTOLIC BLOOD PRESSURE: 41 MMHG | SYSTOLIC BLOOD PRESSURE: 90 MMHG

## 2018-02-23 VITALS — DIASTOLIC BLOOD PRESSURE: 53 MMHG | SYSTOLIC BLOOD PRESSURE: 93 MMHG

## 2018-02-23 VITALS — DIASTOLIC BLOOD PRESSURE: 52 MMHG | SYSTOLIC BLOOD PRESSURE: 97 MMHG

## 2018-02-23 VITALS — DIASTOLIC BLOOD PRESSURE: 51 MMHG | SYSTOLIC BLOOD PRESSURE: 99 MMHG

## 2018-02-23 VITALS — DIASTOLIC BLOOD PRESSURE: 41 MMHG | SYSTOLIC BLOOD PRESSURE: 92 MMHG

## 2018-02-23 VITALS — DIASTOLIC BLOOD PRESSURE: 40 MMHG | SYSTOLIC BLOOD PRESSURE: 108 MMHG

## 2018-02-23 VITALS — SYSTOLIC BLOOD PRESSURE: 100 MMHG | DIASTOLIC BLOOD PRESSURE: 41 MMHG

## 2018-02-23 VITALS — SYSTOLIC BLOOD PRESSURE: 96 MMHG | DIASTOLIC BLOOD PRESSURE: 40 MMHG

## 2018-02-23 VITALS — DIASTOLIC BLOOD PRESSURE: 58 MMHG | SYSTOLIC BLOOD PRESSURE: 88 MMHG

## 2018-02-23 VITALS — SYSTOLIC BLOOD PRESSURE: 102 MMHG | DIASTOLIC BLOOD PRESSURE: 54 MMHG

## 2018-02-23 VITALS — DIASTOLIC BLOOD PRESSURE: 41 MMHG | SYSTOLIC BLOOD PRESSURE: 98 MMHG

## 2018-02-23 VITALS — DIASTOLIC BLOOD PRESSURE: 42 MMHG | SYSTOLIC BLOOD PRESSURE: 90 MMHG

## 2018-02-23 VITALS — SYSTOLIC BLOOD PRESSURE: 102 MMHG | DIASTOLIC BLOOD PRESSURE: 59 MMHG

## 2018-02-23 VITALS — DIASTOLIC BLOOD PRESSURE: 55 MMHG | SYSTOLIC BLOOD PRESSURE: 111 MMHG

## 2018-02-23 VITALS — SYSTOLIC BLOOD PRESSURE: 100 MMHG | DIASTOLIC BLOOD PRESSURE: 49 MMHG

## 2018-02-23 LAB
% IRON SATURATION: 33 % (ref 15–50)
ADD MANUAL DIFF: YES
ANION GAP SERPL CALC-SCNC: 6 MMOL/L (ref 5–15)
BUN SERPL-MCNC: 29 MG/DL (ref 7–18)
CALCIUM SERPL-MCNC: 8.9 MG/DL (ref 8.5–10.1)
CHLORIDE SERPL-SCNC: 104 MMOL/L (ref 98–107)
CO2 SERPL-SCNC: 25 MMOL/L (ref 21–32)
CREAT SERPL-MCNC: 1.1 MG/DL (ref 0.55–1.3)
ERYTHROCYTE [DISTWIDTH] IN BLOOD BY AUTOMATED COUNT: 12.6 % (ref 11.6–14.8)
FERRITIN SERPL-MCNC: 209 NG/ML (ref 8–388)
HCT VFR BLD CALC: 28.5 % (ref 42–52)
HGB BLD-MCNC: 9.9 G/DL (ref 14.2–18)
IRON SERPL-MCNC: 80 UG/DL (ref 50–175)
MCV RBC AUTO: 100 FL (ref 80–99)
PLATELET # BLD: 88 K/UL (ref 150–450)
POTASSIUM SERPL-SCNC: 4.8 MMOL/L (ref 3.5–5.1)
RBC # BLD AUTO: 2.86 M/UL (ref 4.7–6.1)
SODIUM SERPL-SCNC: 135 MMOL/L (ref 136–145)
TIBC SERPL-MCNC: 245 UG/DL (ref 250–450)
UNSATURATED IRON BINDING: 165 UG/DL (ref 112–346)
WBC # BLD AUTO: 8 K/UL (ref 4.8–10.8)

## 2018-02-23 PROCEDURE — 0QS604Z REPOSITION RIGHT UPPER FEMUR WITH INTERNAL FIXATION DEVICE, OPEN APPROACH: ICD-10-PCS

## 2018-02-23 RX ADMIN — IPRATROPIUM BROMIDE AND ALBUTEROL SULFATE SCH ML: .5; 3 SOLUTION RESPIRATORY (INHALATION) at 11:29

## 2018-02-23 RX ADMIN — IPRATROPIUM BROMIDE AND ALBUTEROL SULFATE SCH ML: .5; 3 SOLUTION RESPIRATORY (INHALATION) at 06:41

## 2018-02-23 RX ADMIN — MORPHINE SULFATE PRN MG: 4 INJECTION INTRAVENOUS at 04:18

## 2018-02-23 RX ADMIN — DOCUSATE SODIUM SCH MG: 100 CAPSULE, LIQUID FILLED ORAL at 08:40

## 2018-02-23 RX ADMIN — ASPIRIN SCH MG: 81 TABLET, DELAYED RELEASE ORAL at 08:40

## 2018-02-23 RX ADMIN — IPRATROPIUM BROMIDE AND ALBUTEROL SULFATE SCH ML: .5; 3 SOLUTION RESPIRATORY (INHALATION) at 02:00

## 2018-02-23 RX ADMIN — MORPHINE SULFATE PRN MG: 4 INJECTION INTRAVENOUS at 10:25

## 2018-02-23 RX ADMIN — IPRATROPIUM BROMIDE AND ALBUTEROL SULFATE SCH ML: .5; 3 SOLUTION RESPIRATORY (INHALATION) at 20:05

## 2018-02-23 RX ADMIN — HYDROCODONE BITARTRATE AND ACETAMINOPHEN PRN TAB: 7.5; 325 TABLET ORAL at 20:57

## 2018-02-23 RX ADMIN — SODIUM CHLORIDE SCH MLS/HR: 0.9 INJECTION INTRAVENOUS at 21:56

## 2018-02-23 NOTE — DIAGNOSTIC IMAGING REPORT
Indication: Right hip fracture, pain, intraoperative imaging

 

Technique: Intraoperative images

 

Comparison: 2/21/2018

 

Findings: Intraoperative images document surgical repair of previously demonstrated

right hip intertrochanteric fracture with medullary nail and compression screw

 

Impression: Intraoperative imaging, as described

## 2018-02-23 NOTE — GENERAL PROGRESS NOTE
Assessment/Plan


Problem List:  


(1) Fracture of right hip


ICD Codes:  S72.001A - Fracture of unspecified part of neck of right femur, 

initial encounter for closed fracture


SNOMED:  361068096


(2) Syncope


ICD Codes:  R55 - Syncope and collapse


SNOMED:  004746320


(3) SHADY (acute kidney injury)


ICD Codes:  N17.9 - Acute kidney failure, unspecified


SNOMED:  62125333


(4) Hyponatremia


ICD Codes:  E87.1 - Hypo-osmolality and hyponatremia


SNOMED:  96300566


(5) CAD (coronary artery disease)


ICD Codes:  I25.10 - Atherosclerotic heart disease of native coronary artery 

without angina pectoris


SNOMED:  76704948


(6) COPD (chronic obstructive pulmonary disease)


ICD Codes:  J44.9 - Chronic obstructive pulmonary disease, unspecified


SNOMED:  90612089


(7) HTN (hypertension)


ICD Codes:  I10 - Essential (primary) hypertension


SNOMED:  30448011


(8) HLD (hyperlipidemia)


ICD Codes:  E78.5 - Hyperlipidemia, unspecified


SNOMED:  27909051


(9) BPH (benign prostatic hyperplasia)


ICD Codes:  N40.0 - Benign prostatic hyperplasia without lower urinary tract 

symptoms


SNOMED:  376609928


(10) Tobacco abuse


ICD Codes:  Z72.0 - Tobacco use


SNOMED:  176985634


(11) PVD (peripheral vascular disease)


ICD Codes:  I73.9 - Peripheral vascular disease, unspecified


SNOMED:  440499160


(12) Alcoholic liver disease


Assessment & Plan:  Cirrhosis


ICD Codes:  K70.9 - Alcoholic liver disease, unspecified


SNOMED:  74625767


(13) Acute blood loss anemia


ICD Codes:  D62 - Acute posthemorrhagic anemia


SNOMED:  465295569


Status:  stable


Assessment/Plan


Labs with evidence of dehydration. Syncope may be 2/2 orthostatic hypotension





Ortho consulted


NWB RLE


Plan for ORIF today, NPO since MN


Pain control, bowel regimen


Cardiology consulted


Trop neg, TTE--EF wnl


Per cardiology, pt is cleared to proceed to OR


F/u carotid duplex


IVFs for SHADY


Cont home meds but hold home lasix and aldactone for now given SHADY





DVT Prophylaxis:   SCD


Code Status:            Full


Hospital Classification Declaration: Based on this initial evaluation, and 

depending on the patient's clinical course, I anticipate that this patient will 

require hospitalization for 1-2 days for R hip fracture, syncope, SHADY and close 

respiratory/hemodynamic monitoring.





Disposition: Once the patient is stable to leave the hospital, I anticipate the 

patient will likely be discharged to the following environment: SNF vs ARU





Discussed with patient/family, nursing staff, SW/CM, ortho, cardiology 

regarding clinical status, treatment course, and disposition planning.





Time of note may not reflect time of encounter.





Subjective


Date patient seen:  Feb 23, 2018


Time patient seen:  11:00


ROS Limited/Unobtainable:  No


Constitutional:  Reports: no symptoms


HEENT:  Reports: no symptoms


Cardiovascular:  Reports: no symptoms


Respiratory:  Reports: no symptoms


Gastrointestinal/Abdominal:  Reports: no symptoms


Genitourinary:  Reports: no symptoms


Neurologic/Psychiatric:  Reports: no symptoms


Endocrine:  Reports: no symptoms


Hematologic/Lymphatic:  Reports: no symptoms


Allergies:  


Coded Allergies:  


     No Known Allergies (Unverified , 2/21/18)


Subjective


No acute o/n events


Awaiting OR for ORIF today per ortho





Pain controlled. Denies f/c, n/v, d/c, chest pain, SOB, abd pain





Objective





Last 24 Hour Vital Signs








  Date Time  Temp Pulse Resp B/P (MAP) Pulse Ox O2 Delivery O2 Flow Rate FiO2


 


2/23/18 20:16  97 18  99 Nasal Cannula 3.0 32


 


2/23/18 20:06  98 16  98 Nasal Cannula 3.0 32


 


2/23/18 20:05     98 Nasal Cannula 3.0 32


 


2/23/18 20:05      Nasal Cannula 3.0 32


 


2/23/18 19:52 97.4 96 18 100/46 98 Nasal Cannula 3.0 





 97.4       


 


2/23/18 18:39 98.8 96 21 102/59 97   





 98.8       


 


2/23/18 18:16 99.5 95 20 108/40 98 Nasal Cannula 3.0 





 99.5       


 


2/23/18 18:09  92 20 96/40 98 Nasal Cannula 3.0 


 


2/23/18 17:50  87 20 93/53 98 Nasal Cannula 3.0 


 


2/23/18 17:45  93 20 88/58 96 Nasal Cannula 3.0 


 


2/23/18 17:30  92 20 118/66 98 Nasal Cannula 3.0 


 


2/23/18 17:13  87 20 90/42 98 Nasal Cannula 3.0 


 


2/23/18 17:00  82 20 85/41 99 Nasal Cannula 3.0 


 


2/23/18 16:45  82 20 90/41 100 Simple Mask 8.0 


 


2/23/18 16:30  82 20 92/41 100 Simple Mask 8.0 


 


2/23/18 16:15  82 20 100/41 100 Simple Mask 8.0 


 


2/23/18 16:00  81 20 99/51 100 Simple Mask 8.0 


 


2/23/18 15:55  79 20 98/41 100 Simple Mask 8.0 


 


2/23/18 15:45  79 20 99/42 100 Simple Mask 8.0 


 


2/23/18 15:40 97.5 80 20 100/49 100 Simple Mask 8.0 





 97.5       


 


2/23/18 12:00 97.3 86 18 102/54 95 Nasal Cannula  





 97.3       


 


2/23/18 12:00  96      


 


2/23/18 11:38  87 18  99 Nasal Cannula 3.0 32


 


2/23/18 11:29  88 16  96 Nasal Cannula 3.0 32


 


2/23/18 10:55 98.7       


 


2/23/18 10:25 98.7       


 


2/23/18 08:00  80      


 


2/23/18 08:00 98.5 84 18 111/55 95 Nasal Cannula  





 98.5       


 


2/23/18 06:51  94 20  95 Nasal Cannula 3.0 32


 


2/23/18 06:41     93 Nasal Cannula 3.0 32


 


2/23/18 06:41      Nasal Cannula 3.0 32


 


2/23/18 06:41  92 18  93 Nasal Cannula 3.0 32


 


2/23/18 04:18 98.7       


 


2/23/18 04:00 98.7 90 23 101/47 93 Nasal Cannula  





 98.7       


 


2/23/18 04:00  82      


 


2/23/18 02:13  84 20  93 Nasal Cannula 3.0 32


 


2/23/18 02:03  90 22  93 Nasal Cannula 3.0 32


 


2/23/18 00:00 98.9 64 20 97/52 91 Nasal Cannula  





 98.9       


 


2/23/18 00:00  74      

















Intake and Output  


 


 2/22/18 2/23/18





 19:00 07:00


 


Intake Total 1790 ml 


 


Output Total 750 ml 500 ml


 


Balance 1040 ml -500 ml


 


  


 


Intake Oral 700 ml 


 


IV Total 1090 ml 


 


Output Urine Total 750 ml 500 ml








Laboratory Tests


2/23/18 06:50: 


White Blood Count 8.0, Red Blood Count 2.86L, Hemoglobin 9.9L, Hematocrit 28.5L

, Mean Corpuscular Volume 100H, Mean Corpuscular Hemoglobin 34.8H, Mean 

Corpuscular Hemoglobin Concent 34.9, Red Cell Distribution Width 12.6, Platelet 

Count 88L, Mean Platelet Volume 5.9L, Neutrophils (%) (Auto) , Lymphocytes (%) (

Auto) , Monocytes (%) (Auto) , Eosinophils (%) (Auto) , Basophils (%) (Auto) , 

Differential Total Cells Counted 100, Neutrophils % (Manual) 56, Lymphocytes % (

Manual) 26, Monocytes % (Manual) 17H, Eosinophils % (Manual) 1, Basophils % (

Manual) 0, Band Neutrophils 0, Platelet Estimate DecreasedL, Platelet 

Morphology Normal, Hypochromasia 2+, Spherocytes 1+, Reticulocyte Count 1.0, 

Sodium Level 135L, Potassium Level 4.8, Chloride Level 104, Carbon Dioxide 

Level 25, Anion Gap 6, Blood Urea Nitrogen 29H, Creatinine 1.1, Estimat 

Glomerular Filtration Rate , Glucose Level 107H, Calcium Level 8.9, Iron Level 

80, Total Iron Binding Capacity 245L, Percent Iron Saturation 33, Unsaturated 

Iron Binding 165, Ferritin 209, Folate 47.5, Hepatitis A IgM Antibody [Pending]

, Hepatitis B Surface Antigen [Pending], Hepatitis B Core IgM Antibody [Pending]

, Hepatitis C Antibody [Pending], HIV (1&2) Antibody Rapid Negative


Height (Feet):  5


Height (Inches):  6.00


Weight (Pounds):  135


Objective


General: alert, cooperative, no distress, appears stated age


Head: normocephalic, without obvious abnormality, atraumatic


Eyes: conjunctivae/corneas clear. PERRL, EOM's intact


Throat: lips, mucosa, and tongue normal. Dry MM


Neck: supple, symmetrical, trachea midline, and no JVD


Lungs: clear to auscultation bilaterally


Heart: regular rate and rhythm, S1, S2 normal, no murmur, click, rub or gallop


Abdomen: soft, non-tender, non-distended, bowel sounds normal


Extremities: +R hip TTP w/ ROM limited 2/2 pain, no cyanosis or edema


Pulses: 2+ and symmetric


Skin: skin color, texture, turgor normal; +abrasions on extremities


Neurologic: grossly normal, no focal deficits











Pankaj Solo M.D. Feb 23, 2018 23:55

## 2018-02-23 NOTE — OPERATIVE NOTE - DICTATED
DATE OF OPERATION:  02/23/2018



PREOPERATIVE DIAGNOSIS:  Right hip intertrochanteric fracture with

comminution.



POSTOPERATIVE:  Right hip intertrochanteric fracture with comminution.



PROCEDURE:  Right hip open reduction and internal fixation using a short

125-degree gamma nail with proximal as well as distal screw fixation.



SURGEON:  Mg Charles M.D.



ASSISTANT:  Katherine Escobedo PA-C.



ANESTHESIOLOGIST:  Dr. Montgomery.



ANESTHESIA:  General LMA anesthesia.



ESTIMATED BLOOD LOSS:  Less than 100 mL.



COMPLICATIONS:  None.



BRIEF HISTORY:  The patient is a pleasant 72-year-old gentleman who was

admitted due to a mechanical fall and right hip fracture.  After full

discussion of risks and benefits of the surgery and complications

associated with it including infection, bleeding, neurovascular

complication, possibility of malunion, possibility of nonunion,

possibility of hardware failure, possibility of hardware pullout, and

other complications that may arise as well as nonunion, malunion, and need

for further surgery and conversion to hemiarthroplasty, he opted for

surgical treatment as described above.



OPERATIVE PROCEDURE:  The patient was brought to the operating room and was

placed on operative table.  All pressure points were well padded.  General

LMA anesthesia was induced.  The patient was placed on a fracture table

and the right leg was placed in traction and the left leg in well-leg

carlos.  All pressure points were well padded.  The right hip was then

reduced and the image was brought in.  Using an image intensifier, the

fracture was reduced near-anatomically.  Once this was completed, the

right hip and right leg was prepped and draped in usual sterile fashion.

Standard 3-cm incision was made proximal to the greater trochanter.  The

gluteal fascia was opened.  The greater trochanter was then palpated and a

guidewire was placed into the greater trochanter into the diaphyseal

region across the fracture site.  The opening reamer was used to open the

entry hole and a 125-degree short gamma nail was then placed in without

any complication.  At this point, a guidepin was placed through the

lateral cortex of the femur into the neck, into the head.  This was

checked on AP and lateral, and was in central-central position.

Measurements were made and 100-mm lag screw appeared to be the right size.

Lag screw was then reamed and placed in without any complications.  At

this point, a locking screw was then locked in on top of the lag screw and

it was backed off by half a turn.  At this point, using a guide, a distal

35-mm screw was placed in for rotational stability.  Once this was

completed, the position of all implants was checked.  The fracture was

reduced anatomically.  The hardware was in excellent position on AP as

well as lateral views.  At this point, all wounds were thoroughly

irrigated using copious amount of fluid.  Gluteal fascia was closed using

#1 Vicryl suture.  Subcutaneous tissue was closed using 2-0 Vicryl suture.

Skin was closed was closed using 3-0 Monocryl suture.  Sterile dressing

was applied.  The patient was taken to recovery room in stable condition.

All lap counts and instrument counts were correct.









  ______________________________________________

  Mg Charles M.D.





DR:  Jerry

D:  02/23/2018 15:23

T:  02/23/2018 20:24

JOB#:  [id number]

CC:

## 2018-02-23 NOTE — ANETHESIA PREOPERATIVE EVAL
Anesthesia Pre-op PMH/ROS


General


Date of Evaluation:  Feb 23, 2018


Time of Evaluation:  13:00


ASA Score:  ASA 4


Mallampati Score


Class I : Soft palate, uvula, fauces, pillars visible


Class II: Soft palate, uvula, fauces visible


Class III: Soft palate, base of uvula visible


Class IV: Only hard plate visible


Mallampati Classification:  Class I


Anesthesia History:  none


Family History:  no anesthesia problems


Allergies:  


Coded Allergies:  


     No Known Allergies (Unverified , 2/21/18)





Past Medical History


Cardiovascular:  Reports: HTN, CAD


Pulmonary:  Reports: COPD


Gastrointestinal/Genitourinary:  Reports: GERD





Anesthesia Pre-op Phys. Exam


Physician Exam





Last Vital Signs








  Date Time  Temp Pulse Resp B/P (MAP) Pulse Ox O2 Delivery O2 Flow Rate FiO2


 


2/23/18 11:38  87 18  99 Nasal Cannula 3.0 32


 


2/23/18 10:55 98.7       


 


2/23/18 08:00    111/55    











Airway Exam


Mallampati Score:  Class III





Anesthesia Pre-op A/P


Labs





Hematology








Test


  2/23/18


06:50


 


White Blood Count


  8.0 K/UL


(4.8-10.8)


 


Red Blood Count


  2.86 M/UL


(4.70-6.10)  L


 


Hemoglobin


  9.9 G/DL


(14.2-18.0)  L


 


Hematocrit


  28.5 %


(42.0-52.0)  L


 


Mean Corpuscular Volume


  100 FL (80-99)


H


 


Mean Corpuscular Hemoglobin


  34.8 PG


(27.0-31.0)  H


 


Mean Corpuscular Hemoglobin


Concent 34.9 G/DL


(32.0-36.0)


 


Red Cell Distribution Width


  12.6 %


(11.6-14.8)


 


Platelet Count


  88 K/UL


(150-450)  L


 


Mean Platelet Volume


  5.9 FL


(6.5-10.1)  L


 


Neutrophils (%) (Auto)


  % (45.0-75.0)


 


 


Lymphocytes (%) (Auto)


  % (20.0-45.0)


 


 


Monocytes (%) (Auto)  % (1.0-10.0)  


 


Eosinophils (%) (Auto)  % (0.0-3.0)  


 


Basophils (%) (Auto)  % (0.0-2.0)  


 


Differential Total Cells


Counted 100  


 


 


Neutrophils % (Manual) 56 % (45-75)  


 


Lymphocytes % (Manual) 26 % (20-45)  


 


Monocytes % (Manual) 17 % (1-10)  H


 


Eosinophils % (Manual) 1 % (0-3)  


 


Basophils % (Manual) 0 % (0-2)  


 


Band Neutrophils 0 % (0-8)  


 


Platelet Estimate Decreased  L


 


Platelet Morphology Normal  


 


Hypochromasia 2+  


 


Spherocytes 1+  








Chemistry








Test


  2/23/18


06:50


 


Sodium Level


  135 MMOL/L


(136-145)  L


 


Potassium Level


  4.8 MMOL/L


(3.5-5.1)


 


Chloride Level


  104 MMOL/L


()


 


Carbon Dioxide Level


  25 MMOL/L


(21-32)


 


Anion Gap


  6 mmol/L


(5-15)


 


Blood Urea Nitrogen


  29 mg/dL


(7-18)  H


 


Creatinine


  1.1 MG/DL


(0.55-1.30)


 


Estimat Glomerular Filtration


Rate  mL/min (>60)  


 


 


Glucose Level


  107 MG/DL


()  H


 


Calcium Level


  8.9 MG/DL


(8.5-10.1)

















Syeda Mann MD Feb 23, 2018 15:07

## 2018-02-23 NOTE — DIAGNOSTIC IMAGING REPORT
Indication: Postoperative, hip fracture

 

Technique: One view of the pelvis

 

Comparison: 2/21/2018

 

Findings: Interim surgical repair of previously demonstrated right hip

intertrochanteric fracture with medullary temitope and compression screw. Retained air

from the surgical exposure is seen within the soft tissues. There is good anatomic

alignment. Incidentally noted is an arterial stent in the left common iliac artery

 

Impression: Postoperative right hip. No unusual features

## 2018-02-23 NOTE — BRIEF OPERATIVE NOTE
Immediate Post Operative Note


Operative Note


Chief Complaint:  rt hip pain


Pre-op Diagnosis:


rt hip IT fracture


Procedure:


Rt hip ORIF


Post-op Diagnosis:  same as pre-op


Findings:  consistent w/pre-op dx studies


Surgeon:  md Araceli


Assistant:  tammy isbell


Anesthesiologist:  md sybil


Anesthesia:  general


Specimen:  none


Complications:  none


Condition:  stable


Fluids:  ns


Estimated Blood Loss:  minimal


Drains:  none


Implant(s) used?:  Yes - MIKO Hernandez Feb 23, 2018 15:35

## 2018-02-23 NOTE — IMMEDIATE POST-OP EVALUATION
Immediate Post-Op Evalulation


Immediate Post-Op Evalulation


Procedure:  r femur orif


Date of Evaluation:  Feb 23, 2018


Time of Evaluation:  15:42


Nausea:  No


Vomiting:  No











Syeda Mann MD Feb 23, 2018 15:42

## 2018-02-23 NOTE — CARDIOLOGY REPORT
--------------- APPROVED REPORT --------------





EXAM: Two-dimensional and M-mode echocardiogram with Doppler and color 

Doppler.



INDICATION

PRE-OP



M-Mode DIMENSIONS 

IVSd1.5 (0.7-1.1cm)Left Atrium (MM)2.8 (1.6-4.0cm)

LVDd5.2 (3.5-5.6cm)Aortic Root4.2 (2.0-3.7cm)

PWd1.7 (0.7-1.1cm)Aortic Cusp Exc.1.9 (1.5-2.0cm)

IVSs2.1 cm

LVDs3.4 (2.5-4.0cm)

PWs1.9 cm





Technically difficult study due to poor acoustical windows.

Normal left ventricular chamber size, systolic function and wall motion to extent 

visualized.

Left ventricular ejection fraction estimated to be  60-65 %.

No evidence of left ventricular hypertrophy.

No evidence of pericardial effusion. 

All other cardiac chamber sizes are within normal.

Focal aortic valve sclerosis with adequate cusp excursion.

Thickened mitral valve leaflets with normal excursion.

Mitral annulus and aortic root calcification.

Pulmonic valve not well visualized.

Normal tricuspid valve structure. 

IVC dilated at 2.3 cm with slight physiologic collapse suggestive of increased RA 

pressure.



A  color flow and spectral Doppler study was performed and revealed:

No aortic regurgitation.

Trace  mitral regurgitation.

Mitral diastolic velocities suggest reduced left ventricular relaxation c/w mild LV 

diastolic dysfunction (Grade I ). 

Trace  tricuspid regurgitation.

No  pulmonic regurgitation present

## 2018-02-23 NOTE — CARDIAC ELECTROPHYSIOLOGY PN
Assessment/Plan


Assessment/Plan


1. Status post fall versus syncope.  He is already ruled out for myocardial


infarction.  His vital signs have been stable.  EF 65%


2. History of asthma, on albuterol.


3. Right hip fracture. S/P ORIF by Dr Edmonds today


No cardiac events 





DW RN and Dr Charles





Subjective


Subjective


Underwent successful Right hip surgery today





Objective





Last 24 Hour Vital Signs








  Date Time  Temp Pulse Resp B/P (MAP) Pulse Ox O2 Delivery O2 Flow Rate FiO2


 


2/23/18 12:00 97.3 86 18 102/54 95 Nasal Cannula  





 97.3       


 


2/23/18 11:38  87 18  99 Nasal Cannula 3.0 32


 


2/23/18 11:29  88 16  96 Nasal Cannula 3.0 32


 


2/23/18 10:55 98.7       


 


2/23/18 10:25 98.7       


 


2/23/18 08:00  80      


 


2/23/18 08:00 98.5 84 18 111/55 95 Nasal Cannula  





 98.5       


 


2/23/18 06:51  94 20  95 Nasal Cannula 3.0 32


 


2/23/18 06:41     93 Nasal Cannula 3.0 32


 


2/23/18 06:41      Nasal Cannula 3.0 32


 


2/23/18 06:41  92 18  93 Nasal Cannula 3.0 32


 


2/23/18 04:18 98.7       


 


2/23/18 04:00 98.7 90 23 101/47 93 Nasal Cannula  





 98.7       


 


2/23/18 04:00  82      


 


2/23/18 02:13  84 20  93 Nasal Cannula 3.0 32


 


2/23/18 02:03  90 22  93 Nasal Cannula 3.0 32


 


2/23/18 00:00 98.9 64 20 97/52 91 Nasal Cannula  





 98.9       


 


2/23/18 00:00  74      


 


2/22/18 21:52 97.7       


 


2/22/18 20:00  85      


 


2/22/18 20:00 97.7 79 24 93/55 98 Nasal Cannula  





 97.7       


 


2/22/18 19:48  78 18  94 Nasal Cannula 3.0 32


 


2/22/18 19:39      Nasal Cannula 2.0 28


 


2/22/18 19:39  74 18  92 Nasal Cannula 2.0 28


 


2/22/18 19:37     93 Nasal Cannula 2.0 28


 


2/22/18 17:07 97.0       


 


2/22/18 16:08 97.0       


 


2/22/18 16:00 98.1 76 18 94/54 97 Nasal Cannula  





 98.1       


 


2/22/18 16:00  70      

















Intake and Output  


 


 2/22/18 2/23/18





 19:00 07:00


 


Intake Total 1790 ml 


 


Output Total 750 ml 500 ml


 


Balance 1040 ml -500 ml


 


  


 


Intake Oral 700 ml 


 


IV Total 1090 ml 


 


Output Urine Total 750 ml 500 ml











Laboratory Tests








Test


  2/23/18


06:50


 


White Blood Count


  8.0 K/UL


(4.8-10.8)


 


Red Blood Count


  2.86 M/UL


(4.70-6.10)  L


 


Hemoglobin


  9.9 G/DL


(14.2-18.0)  L


 


Hematocrit


  28.5 %


(42.0-52.0)  L


 


Mean Corpuscular Volume


  100 FL (80-99)


H


 


Mean Corpuscular Hemoglobin


  34.8 PG


(27.0-31.0)  H


 


Mean Corpuscular Hemoglobin


Concent 34.9 G/DL


(32.0-36.0)


 


Red Cell Distribution Width


  12.6 %


(11.6-14.8)


 


Platelet Count


  88 K/UL


(150-450)  L


 


Mean Platelet Volume


  5.9 FL


(6.5-10.1)  L


 


Neutrophils (%) (Auto)


  % (45.0-75.0)


 


 


Lymphocytes (%) (Auto)


  % (20.0-45.0)


 


 


Monocytes (%) (Auto)  % (1.0-10.0)  


 


Eosinophils (%) (Auto)  % (0.0-3.0)  


 


Basophils (%) (Auto)  % (0.0-2.0)  


 


Differential Total Cells


Counted 100  


 


 


Neutrophils % (Manual) 56 % (45-75)  


 


Lymphocytes % (Manual) 26 % (20-45)  


 


Monocytes % (Manual) 17 % (1-10)  H


 


Eosinophils % (Manual) 1 % (0-3)  


 


Basophils % (Manual) 0 % (0-2)  


 


Band Neutrophils 0 % (0-8)  


 


Platelet Estimate Decreased  L


 


Platelet Morphology Normal  


 


Hypochromasia 2+  


 


Spherocytes 1+  


 


Reticulocyte Count Pending  


 


Sodium Level


  135 MMOL/L


(136-145)  L


 


Potassium Level


  4.8 MMOL/L


(3.5-5.1)


 


Chloride Level


  104 MMOL/L


()


 


Carbon Dioxide Level


  25 MMOL/L


(21-32)


 


Anion Gap


  6 mmol/L


(5-15)


 


Blood Urea Nitrogen


  29 mg/dL


(7-18)  H


 


Creatinine


  1.1 MG/DL


(0.55-1.30)


 


Estimat Glomerular Filtration


Rate  mL/min (>60)  


 


 


Glucose Level


  107 MG/DL


()  H


 


Calcium Level


  8.9 MG/DL


(8.5-10.1)


 


Iron Level Pending  


 


Unsaturated Iron Binding Pending  


 


Ferritin Pending  


 


Folate Pending  


 


Hepatitis A IgM Antibody Pending  


 


Hepatitis B Surface Antigen Pending  


 


Hepatitis B Core IgM Antibody Pending  


 


Hepatitis C Antibody Pending  


 


HIV (1&2) Antibody Rapid Pending  








Objective


HEAD AND NECK:  Showed no JVD or carotid bruit.


LUNGS:  Clear.


CARDIOVASCULAR:  Shows regular S1 and S2 with no gallop or murmur.


 


ABDOMEN:  Soft.


EXTREMITIES:  Showed no pitting edema.  S/P Right hip surgery











NOHELIA OREILLY Feb 23, 2018 15:47

## 2018-02-23 NOTE — PRE-PROCEDURE NOTE/ATTESTATION
Pre-Procedure Note/Attestation


Complete Prior to Procedure


Planned Procedure:  right


Procedure Narrative:


Rt hip ORIF





Indications for Procedure


Pre-Operative Diagnosis:


Rt hip IT fracture





Attestation


I attest that I discussed the nature of the procedure; its benefits; risks and 

complications; and alternatives (and the risks and benefits of such alternatives

), prior to the procedure, with the patient (or the patient's legal 

representative).





I attest that, if there was a reasonable possibility of needing a blood 

transfusion, the patient (or the patient's legal representative) was given the 

[facility] standardized written summary, pursuant 

to the [employer] Blood Safety Act ([place] Health and Safety Code # 1645, as 

amended).





I attest that I re-evaluated the patient just prior to the surgery and that 

there has been no change in the patient's H&P, except as documented below: NONE











CRIS EASTMAN Feb 23, 2018 10:33

## 2018-02-24 VITALS — DIASTOLIC BLOOD PRESSURE: 47 MMHG | SYSTOLIC BLOOD PRESSURE: 99 MMHG

## 2018-02-24 VITALS — DIASTOLIC BLOOD PRESSURE: 55 MMHG | SYSTOLIC BLOOD PRESSURE: 91 MMHG

## 2018-02-24 VITALS — DIASTOLIC BLOOD PRESSURE: 53 MMHG | SYSTOLIC BLOOD PRESSURE: 84 MMHG

## 2018-02-24 VITALS — SYSTOLIC BLOOD PRESSURE: 104 MMHG | DIASTOLIC BLOOD PRESSURE: 56 MMHG

## 2018-02-24 VITALS — SYSTOLIC BLOOD PRESSURE: 99 MMHG | DIASTOLIC BLOOD PRESSURE: 51 MMHG

## 2018-02-24 VITALS — DIASTOLIC BLOOD PRESSURE: 51 MMHG | SYSTOLIC BLOOD PRESSURE: 98 MMHG

## 2018-02-24 VITALS — DIASTOLIC BLOOD PRESSURE: 51 MMHG | SYSTOLIC BLOOD PRESSURE: 105 MMHG

## 2018-02-24 VITALS — SYSTOLIC BLOOD PRESSURE: 88 MMHG | DIASTOLIC BLOOD PRESSURE: 47 MMHG

## 2018-02-24 VITALS — DIASTOLIC BLOOD PRESSURE: 74 MMHG | SYSTOLIC BLOOD PRESSURE: 109 MMHG

## 2018-02-24 LAB
ADD MANUAL DIFF: YES
ANION GAP SERPL CALC-SCNC: 6 MMOL/L (ref 5–15)
ANION GAP SERPL CALC-SCNC: 7 MMOL/L (ref 5–15)
BUN SERPL-MCNC: 24 MG/DL (ref 7–18)
BUN SERPL-MCNC: 25 MG/DL (ref 7–18)
CALCIUM SERPL-MCNC: 8.1 MG/DL (ref 8.5–10.1)
CALCIUM SERPL-MCNC: 8.1 MG/DL (ref 8.5–10.1)
CHLORIDE SERPL-SCNC: 106 MMOL/L (ref 98–107)
CHLORIDE SERPL-SCNC: 106 MMOL/L (ref 98–107)
CO2 SERPL-SCNC: 23 MMOL/L (ref 21–32)
CO2 SERPL-SCNC: 23 MMOL/L (ref 21–32)
CREAT SERPL-MCNC: 1 MG/DL (ref 0.55–1.3)
CREAT SERPL-MCNC: 1 MG/DL (ref 0.55–1.3)
ERYTHROCYTE [DISTWIDTH] IN BLOOD BY AUTOMATED COUNT: 12.3 % (ref 11.6–14.8)
HCT VFR BLD CALC: 20.4 % (ref 42–52)
HGB BLD-MCNC: 7.2 G/DL (ref 14.2–18)
MCV RBC AUTO: 100 FL (ref 80–99)
PHOSPHATE SERPL-MCNC: 2.7 MG/DL (ref 2.5–4.9)
PLATELET # BLD: 92 K/UL (ref 150–450)
POTASSIUM SERPL-SCNC: 4.3 MMOL/L (ref 3.5–5.1)
POTASSIUM SERPL-SCNC: 4.4 MMOL/L (ref 3.5–5.1)
RBC # BLD AUTO: 2.05 M/UL (ref 4.7–6.1)
SODIUM SERPL-SCNC: 135 MMOL/L (ref 136–145)
SODIUM SERPL-SCNC: 136 MMOL/L (ref 136–145)
WBC # BLD AUTO: 8.6 K/UL (ref 4.8–10.8)

## 2018-02-24 RX ADMIN — IPRATROPIUM BROMIDE AND ALBUTEROL SULFATE SCH ML: .5; 3 SOLUTION RESPIRATORY (INHALATION) at 01:40

## 2018-02-24 RX ADMIN — DOCUSATE SODIUM SCH MG: 100 CAPSULE, LIQUID FILLED ORAL at 12:54

## 2018-02-24 RX ADMIN — IPRATROPIUM BROMIDE AND ALBUTEROL SULFATE SCH ML: .5; 3 SOLUTION RESPIRATORY (INHALATION) at 12:47

## 2018-02-24 RX ADMIN — IPRATROPIUM BROMIDE AND ALBUTEROL SULFATE SCH ML: .5; 3 SOLUTION RESPIRATORY (INHALATION) at 19:00

## 2018-02-24 RX ADMIN — HYDROCODONE BITARTRATE AND ACETAMINOPHEN PRN TAB: 7.5; 325 TABLET ORAL at 08:37

## 2018-02-24 RX ADMIN — DOCUSATE SODIUM SCH MG: 100 CAPSULE, LIQUID FILLED ORAL at 18:14

## 2018-02-24 RX ADMIN — IPRATROPIUM BROMIDE AND ALBUTEROL SULFATE SCH ML: .5; 3 SOLUTION RESPIRATORY (INHALATION) at 07:16

## 2018-02-24 RX ADMIN — SODIUM CHLORIDE SCH MLS/HR: 0.9 INJECTION INTRAVENOUS at 06:00

## 2018-02-24 RX ADMIN — DOCUSATE SODIUM SCH MG: 100 CAPSULE, LIQUID FILLED ORAL at 08:37

## 2018-02-24 RX ADMIN — TAMSULOSIN HYDROCHLORIDE SCH MG: 0.4 CAPSULE ORAL at 21:02

## 2018-02-24 NOTE — DIAGNOSTIC IMAGING REPORT
Indication: Abdominal pain

 

Technique: Ultrasound of the abdomen.

 

Comparison: None

 

Findings: Study is suboptimal.

 

Liver: A large portion of the liver is grossly normal. Much of the liver is obscured

including the dome.

Gallbladder: Not fully distended. Gallbladder wall is difficult to measure but may be

minimally thickened.

Common bile duct: Not well-visualized.

Pancreas: The visualized portion of pancreas is normal in echogenicity. There are no

masses. The distal body and tail are obscured.

Kidneys: The kidneys are normal in size and echogenicity. There is no hydronephrosis.

Spleen: The spleen is normal in size and echogenicity.

Aorta: The visualized portion of the aorta is normal in caliber. The distal aorta is

obscured.

IVC: The demonstrated portion of the inferior vena cava is normal.

 

 

 

 

Impression: Suboptimal examination.

 

No gross abnormality in the visualized structures.

## 2018-02-24 NOTE — ORTHOPEDIC PROGRESS NOTE
Orthopedic - Progress Note


Subjective


Symptoms:  c/o post-op hip pain, other - hypotensive and low H&H. transfered to 

[facility]. prbcs given





Objective


Vital Signs





Laboratory Tests








Test


  2/24/18


06:40


 


White Blood Count


  8.6 K/UL


(4.8-10.8)


 


Red Blood Count


  2.05 M/UL


(4.70-6.10)  L


 


Hemoglobin


  7.2 G/DL


(14.2-18.0)  L


 


Hematocrit


  20.4 %


(42.0-52.0)  L


 


Mean Corpuscular Volume


  100 FL (80-99)


H


 


Mean Corpuscular Hemoglobin


  34.9 PG


(27.0-31.0)  H


 


Mean Corpuscular Hemoglobin


Concent 35.1 G/DL


(32.0-36.0)


 


Red Cell Distribution Width


  12.3 %


(11.6-14.8)


 


Platelet Count


  92 K/UL


(150-450)  L


 


Mean Platelet Volume


  6.0 FL


(6.5-10.1)  L


 


Neutrophils (%) (Auto)


  % (45.0-75.0)


 


 


Lymphocytes (%) (Auto)


  % (20.0-45.0)


 


 


Monocytes (%) (Auto)  % (1.0-10.0)  


 


Eosinophils (%) (Auto)  % (0.0-3.0)  


 


Basophils (%) (Auto)  % (0.0-2.0)  


 


Differential Total Cells


Counted 100  


 


 


Neutrophils % (Manual) 60 % (45-75)  


 


Lymphocytes % (Manual) 25 % (20-45)  


 


Monocytes % (Manual) 12 % (1-10)  H


 


Eosinophils % (Manual) 3 % (0-3)  


 


Basophils % (Manual) 0 % (0-2)  


 


Band Neutrophils 0 % (0-8)  


 


Platelet Estimate Decreased  L


 


Platelet Morphology Normal  


 


Hypochromasia 1+  


 


Macrocytosis 1+  


 


Sodium Level


  136 MMOL/L


(136-145)


 


Potassium Level


  4.4 MMOL/L


(3.5-5.1)


 


Chloride Level


  106 MMOL/L


()


 


Carbon Dioxide Level


  23 MMOL/L


(21-32)


 


Anion Gap


  7 mmol/L


(5-15)


 


Blood Urea Nitrogen


  25 mg/dL


(7-18)  H


 


Creatinine


  1.0 MG/DL


(0.55-1.30)


 


Estimat Glomerular Filtration


Rate  mL/min (>60)  


 


 


Glucose Level


  124 MG/DL


()  H


 


Calcium Level


  8.1 MG/DL


(8.5-10.1)  L


 


Phosphorus Level


  2.7 MG/DL


(2.5-4.9)


 


Magnesium Level


  1.9 MG/DL


(1.8-2.4)








Last 24 Hour Vital Signs








  Date Time  Temp Pulse Resp B/P (MAP) Pulse Ox O2 Delivery O2 Flow Rate FiO2


 


2/24/18 14:56  97 18 98/51 98 Nasal Cannula 3.0 


 


2/24/18 12:58  92 20  99 Nasal Cannula 3.0 32


 


2/24/18 12:47  90 18  98 Nasal Cannula 3.0 32


 


2/24/18 12:00 98.5 98 17 99/47 98 Nasal Cannula 3.0 





 98.5       


 


2/24/18 10:30 97.9 98  88/47    





 97.9       


 


2/24/18 10:01 98.3       


 


2/24/18 08:37 98.3       


 


2/24/18 08:00 98.3 99 18 91/55 98 Nasal Cannula 3.0 





 98.3       


 


2/24/18 07:26  90 18  99 Nasal Cannula 3.0 32


 


2/24/18 07:20   20 84/53 97 Nasal Cannula 3.0 


 


2/24/18 07:16      Nasal Cannula 3.0 32


 


2/24/18 07:16     99 Nasal Cannula 3.0 32


 


2/24/18 07:16  91 16  99 Nasal Cannula 3.0 32


 


2/24/18 03:24 97.8 103 16 105/51 98 Nasal Cannula 3.0 





 97.8       


 


2/24/18 01:48  99 18  99 Nasal Cannula 3.0 32


 


2/24/18 01:41  102 16  98 Nasal Cannula 3.0 32


 


2/24/18 00:16 98.0 103 17 99/51 95 Nasal Cannula 3.0 





 98.0       


 


2/23/18 20:16  97 18  99 Nasal Cannula 3.0 32


 


2/23/18 20:06  98 16  98 Nasal Cannula 3.0 32


 


2/23/18 20:05     98 Nasal Cannula 3.0 32


 


2/23/18 20:05      Nasal Cannula 3.0 32


 


2/23/18 19:52 97.4 96 18 100/46 98 Nasal Cannula 3.0 





 97.4       


 


2/23/18 18:39 98.8 96 21 102/59 97   





 98.8       


 


2/23/18 18:16 99.5 95 20 108/40 98 Nasal Cannula 3.0 





 99.5       


 


2/23/18 18:09  92 20 96/40 98 Nasal Cannula 3.0 


 


2/23/18 17:50  87 20 93/53 98 Nasal Cannula 3.0 


 


2/23/18 17:45  93 20 88/58 96 Nasal Cannula 3.0 


 


2/23/18 17:30  92 20 118/66 98 Nasal Cannula 3.0 


 


2/23/18 17:13  87 20 90/42 98 Nasal Cannula 3.0 


 


2/23/18 17:00  82 20 85/41 99 Nasal Cannula 3.0 


 


2/23/18 16:45  82 20 90/41 100 Simple Mask 8.0 








I&O











Intake and Output  


 


 2/23/18 2/24/18





 19:00 07:00


 


Intake Total 1900 ml 300 ml


 


Output Total 1800 ml 750 ml


 


Balance 100 ml -450 ml


 


  


 


Intake Oral  300 ml


 


IV Total 1900 ml 


 


Output Urine Total 1800 ml 750 ml








Wound:  clean, dry, intact


Drains:  none


Neuro Status:  normal


Vascular Status:  normal


Additional Comments


xray reviewed





Assessment


Post-op Diagnosis


POD 1


Procedure Performed


Rt hip ORIF





Plan


Plan:  PT, pain management, discharge plan - likely to snf once stable. f/u dr grande 2 weeks, other - monitor H&H. transfuse as needed











MIKO CORTEZ Feb 24, 2018 16:44

## 2018-02-24 NOTE — GENERAL PROGRESS NOTE
Assessment/Plan


Problem List:  


(1) Acute blood loss anemia


ICD Codes:  D62 - Acute posthemorrhagic anemia


SNOMED:  972632107


(2) SHADY (acute kidney injury)


ICD Codes:  N17.9 - Acute kidney failure, unspecified


SNOMED:  16561726


(3) Fall


ICD Codes:  W19.XXXA - Unspecified fall, initial encounter


SNOMED:  0039810, 758340727


Qualifiers:  


   Qualified Codes:  W19.XXXA - Unspecified fall, initial encounter


(4) Right hip pain


ICD Codes:  M25.551 - Pain in right hip


SNOMED:  40517166


(5) Closed right hip fracture


ICD Codes:  S72.001A - Fracture of unspecified part of neck of right femur, 

initial encounter for closed fracture


SNOMED:  651417733


Qualifiers:  


   Qualified Codes:  S72.001A - Fracture of unspecified part of neck of right 

femur, initial encounter for closed fracture


(6) COPD (chronic obstructive pulmonary disease)


ICD Codes:  J44.9 - Chronic obstructive pulmonary disease, unspecified


SNOMED:  92428835


(7) CAD (coronary artery disease)


ICD Codes:  I25.10 - Atherosclerotic heart disease of native coronary artery 

without angina pectoris


SNOMED:  87385964


(8) Tobacco abuse


ICD Codes:  Z72.0 - Tobacco use


SNOMED:  130796899


(9) HTN (hypertension)


ICD Codes:  I10 - Essential (primary) hypertension


SNOMED:  74928664


(10) HLD (hyperlipidemia)


ICD Codes:  E78.5 - Hyperlipidemia, unspecified


SNOMED:  12548860


(11) BPH (benign prostatic hyperplasia)


ICD Codes:  N40.0 - Benign prostatic hyperplasia without lower urinary tract 

symptoms


SNOMED:  604262302


(12) Fracture of right hip


ICD Codes:  S72.001A - Fracture of unspecified part of neck of right femur, 

initial encounter for closed fracture


SNOMED:  526925618


(13) Syncope


ICD Codes:  R55 - Syncope and collapse


SNOMED:  197096395


(14) PVD (peripheral vascular disease)


ICD Codes:  I73.9 - Peripheral vascular disease, unspecified


SNOMED:  649893209


(15) Hyponatremia


ICD Codes:  E87.1 - Hypo-osmolality and hyponatremia


SNOMED:  43168781


(16) Alcoholic liver disease


ICD Codes:  K70.9 - Alcoholic liver disease, unspecified


SNOMED:  62987945


(17) lives in prison


Status:  stable


Assessment/Plan


Labs with evidence of dehydration. Syncope may be 2/2 orthostatic hypotension





1 L NS bolus STAT ordered for hypotension and Hgb 7.2. Transfuse prn Hgb < 7 or 

symptomatic 


s/p R ORIF, POD#1


lovenox 30mg subq x 10 days 


Ortho consulted


NWB RLE


PT eval and treat


Pain control, bowel regimen


Cardiology consulted


Trop neg, TTE--EF wnl 65%


Per cardiology, pt is cleared to proceed to OR


F/u carotid duplex


F/u abdominal U/S 


IVFs for SHADY


Cont home meds but hold home lasix and aldactone for now given SHADY





DVT Prophylaxis:   SCD


Code Status:            Full


Hospital Classification Declaration: Based on this initial evaluation, and 

depending on the patient's clinical course, I anticipate that this patient will 

require hospitalization for 1-2 days for R hip fracture, syncope, SHADY and close 

respiratory/hemodynamic monitoring.





Disposition: Once the patient is stable to leave the hospital, I anticipate the 

patient will likely be discharged to the following environment: SNF vs ARU





Discussed with patient/family, nursing staff, SW/CM, ortho, cardiology 

regarding clinical status, treatment course, and disposition planning.





Time of note may not reflect time of encounter.





Subjective


Date patient seen:  Feb 24, 2018


Allergies:  


Coded Allergies:  


     No Known Allergies (Unverified , 2/21/18)


Subjective


- s/p right ORIF POD#1


- hypotensive to systolic 90s today, Hgb dropped to 7.2. denies dizziness, 

chest pain, sob, palpitations, n/v, abdominal pain


- pain well controlled 


- denies flatus


- afebrile





Objective





Last 24 Hour Vital Signs








  Date Time  Temp Pulse Resp B/P (MAP) Pulse Ox O2 Delivery O2 Flow Rate FiO2


 


2/24/18 10:01 98.3       


 


2/24/18 08:37 98.3       


 


2/24/18 08:00 98.3 99 18 91/55 98 Nasal Cannula 3.0 





 98.3       


 


2/24/18 07:26  90 18  99 Nasal Cannula 3.0 32


 


2/24/18 07:20   20 84/53 97 Nasal Cannula 3.0 


 


2/24/18 07:16      Nasal Cannula 3.0 32


 


2/24/18 07:16     99 Nasal Cannula 3.0 32


 


2/24/18 07:16  91 16  99 Nasal Cannula 3.0 32


 


2/24/18 03:24 97.8 103 16 105/51 98 Nasal Cannula 3.0 





 97.8       


 


2/24/18 01:48  99 18  99 Nasal Cannula 3.0 32


 


2/24/18 01:41  102 16  98 Nasal Cannula 3.0 32


 


2/24/18 00:16 98.0 103 17 99/51 95 Nasal Cannula 3.0 





 98.0       


 


2/23/18 20:16  97 18  99 Nasal Cannula 3.0 32


 


2/23/18 20:06  98 16  98 Nasal Cannula 3.0 32


 


2/23/18 20:05     98 Nasal Cannula 3.0 32


 


2/23/18 20:05      Nasal Cannula 3.0 32


 


2/23/18 19:52 97.4 96 18 100/46 98 Nasal Cannula 3.0 





 97.4       


 


2/23/18 18:39 98.8 96 21 102/59 97   





 98.8       


 


2/23/18 18:16 99.5 95 20 108/40 98 Nasal Cannula 3.0 





 99.5       


 


2/23/18 18:09  92 20 96/40 98 Nasal Cannula 3.0 


 


2/23/18 17:50  87 20 93/53 98 Nasal Cannula 3.0 


 


2/23/18 17:45  93 20 88/58 96 Nasal Cannula 3.0 


 


2/23/18 17:30  92 20 118/66 98 Nasal Cannula 3.0 


 


2/23/18 17:13  87 20 90/42 98 Nasal Cannula 3.0 


 


2/23/18 17:00  82 20 85/41 99 Nasal Cannula 3.0 


 


2/23/18 16:45  82 20 90/41 100 Simple Mask 8.0 


 


2/23/18 16:30  82 20 92/41 100 Simple Mask 8.0 


 


2/23/18 16:15  82 20 100/41 100 Simple Mask 8.0 


 


2/23/18 16:00  81 20 99/51 100 Simple Mask 8.0 


 


2/23/18 15:55  79 20 98/41 100 Simple Mask 8.0 


 


2/23/18 15:45  79 20 99/42 100 Simple Mask 8.0 


 


2/23/18 15:40 97.5 80 20 100/49 100 Simple Mask 8.0 





 97.5       

















Intake and Output  


 


 2/23/18 2/24/18





 19:00 07:00


 


Intake Total 1900 ml 300 ml


 


Output Total 1800 ml 750 ml


 


Balance 100 ml -450 ml


 


  


 


Intake Oral  300 ml


 


IV Total 1900 ml 


 


Output Urine Total 1800 ml 750 ml








Laboratory Tests


2/24/18 06:40: 


White Blood Count 8.6, Red Blood Count 2.05L, Hemoglobin 7.2L, Hematocrit 20.4L

, Mean Corpuscular Volume 100H, Mean Corpuscular Hemoglobin 34.9H, Mean 

Corpuscular Hemoglobin Concent 35.1, Red Cell Distribution Width 12.3, Platelet 

Count 92L, Mean Platelet Volume 6.0L, Neutrophils (%) (Auto) , Lymphocytes (%) (

Auto) , Monocytes (%) (Auto) , Eosinophils (%) (Auto) , Basophils (%) (Auto) , 

Differential Total Cells Counted 100, Neutrophils % (Manual) 60, Lymphocytes % (

Manual) 25, Monocytes % (Manual) 12H, Eosinophils % (Manual) 3, Basophils % (

Manual) 0, Band Neutrophils 0, Platelet Estimate DecreasedL, Platelet 

Morphology Normal, Hypochromasia 1+, Macrocytosis 1+, Sodium Level 136, 

Potassium Level 4.4, Chloride Level 106, Carbon Dioxide Level 23, Anion Gap 7, 

Blood Urea Nitrogen 25H, Creatinine 1.0, Estimat Glomerular Filtration Rate , 

Glucose Level 124H, Calcium Level 8.1L, Phosphorus Level 2.7, Magnesium Level 

1.9


Height (Feet):  5


Height (Inches):  6.00


Weight (Pounds):  135


General Appearance:  no apparent distress, alert


EENT:  PERRL/EOMI, normal ENT inspection


Neck:  non-tender, normal alignment, supple


Cardiovascular:  normal peripheral pulses, normal rate, regular rhythm


Respiratory/Chest:  chest wall non-tender, lungs clear, normal breath sounds


Abdomen:  normal bowel sounds, non tender, soft


Pelvis:  other - right hip dressing


Extremities:  normal range of motion, non-tender


Neurologic:  CNs II-XII grossly normal, no motor/sensory deficits, alert, 

oriented x 3


Skin:  warm/dry











Janet Win N.P. Feb 24, 2018 12:18

## 2018-02-24 NOTE — CARDIAC ELECTROPHYSIOLOGY PN
Assessment/Plan


Assessment/Plan


1. Status post fall versus syncope. Ruled out for myocardial infarction.   EF 65

%


2. History of asthma, on albuterol.


3. Right hip fracture. S/P ORIF by Dr Edmonds. No cardiac events 


4. Hypotension. Lowest 88. Got better with IV fluid now 90/58


5. Anemia Hb dropped form 9 to 7.2. Repeat CBC pending. Likely needs 

transfusion.





JEANNE RN and Lizzethancehaylee at bedside


JEANNE Lira





Subjective


Subjective


Comfortable in NAD. Fiancee at bedside.S/P Right hip surgery yesterday





Objective





Last 24 Hour Vital Signs








  Date Time  Temp Pulse Resp B/P (MAP) Pulse Ox O2 Delivery O2 Flow Rate FiO2


 


2/24/18 12:58  92 20  99 Nasal Cannula 3.0 32


 


2/24/18 12:47  90 18  98 Nasal Cannula 3.0 32


 


2/24/18 10:01 98.3       


 


2/24/18 08:37 98.3       


 


2/24/18 08:00 98.3 99 18 91/55 98 Nasal Cannula 3.0 





 98.3       


 


2/24/18 07:26  90 18  99 Nasal Cannula 3.0 32


 


2/24/18 07:20   20 84/53 97 Nasal Cannula 3.0 


 


2/24/18 07:16      Nasal Cannula 3.0 32


 


2/24/18 07:16     99 Nasal Cannula 3.0 32


 


2/24/18 07:16  91 16  99 Nasal Cannula 3.0 32


 


2/24/18 03:24 97.8 103 16 105/51 98 Nasal Cannula 3.0 





 97.8       


 


2/24/18 01:48  99 18  99 Nasal Cannula 3.0 32


 


2/24/18 01:41  102 16  98 Nasal Cannula 3.0 32


 


2/24/18 00:16 98.0 103 17 99/51 95 Nasal Cannula 3.0 





 98.0       


 


2/23/18 20:16  97 18  99 Nasal Cannula 3.0 32


 


2/23/18 20:06  98 16  98 Nasal Cannula 3.0 32


 


2/23/18 20:05     98 Nasal Cannula 3.0 32


 


2/23/18 20:05      Nasal Cannula 3.0 32


 


2/23/18 19:52 97.4 96 18 100/46 98 Nasal Cannula 3.0 





 97.4       


 


2/23/18 18:39 98.8 96 21 102/59 97   





 98.8       


 


2/23/18 18:16 99.5 95 20 108/40 98 Nasal Cannula 3.0 





 99.5       


 


2/23/18 18:09  92 20 96/40 98 Nasal Cannula 3.0 


 


2/23/18 17:50  87 20 93/53 98 Nasal Cannula 3.0 


 


2/23/18 17:45  93 20 88/58 96 Nasal Cannula 3.0 


 


2/23/18 17:30  92 20 118/66 98 Nasal Cannula 3.0 


 


2/23/18 17:13  87 20 90/42 98 Nasal Cannula 3.0 


 


2/23/18 17:00  82 20 85/41 99 Nasal Cannula 3.0 


 


2/23/18 16:45  82 20 90/41 100 Simple Mask 8.0 


 


2/23/18 16:30  82 20 92/41 100 Simple Mask 8.0 


 


2/23/18 16:15  82 20 100/41 100 Simple Mask 8.0 


 


2/23/18 16:00  81 20 99/51 100 Simple Mask 8.0 


 


2/23/18 15:55  79 20 98/41 100 Simple Mask 8.0 


 


2/23/18 15:45  79 20 99/42 100 Simple Mask 8.0 


 


2/23/18 15:40 97.5 80 20 100/49 100 Simple Mask 8.0 





 97.5       

















Intake and Output  


 


 2/23/18 2/24/18





 19:00 07:00


 


Intake Total 1900 ml 300 ml


 


Output Total 1800 ml 750 ml


 


Balance 100 ml -450 ml


 


  


 


Intake Oral  300 ml


 


IV Total 1900 ml 


 


Output Urine Total 1800 ml 750 ml











Laboratory Tests








Test


  2/24/18


06:40


 


White Blood Count


  8.6 K/UL


(4.8-10.8)


 


Red Blood Count


  2.05 M/UL


(4.70-6.10)  L


 


Hemoglobin


  7.2 G/DL


(14.2-18.0)  L


 


Hematocrit


  20.4 %


(42.0-52.0)  L


 


Mean Corpuscular Volume


  100 FL (80-99)


H


 


Mean Corpuscular Hemoglobin


  34.9 PG


(27.0-31.0)  H


 


Mean Corpuscular Hemoglobin


Concent 35.1 G/DL


(32.0-36.0)


 


Red Cell Distribution Width


  12.3 %


(11.6-14.8)


 


Platelet Count


  92 K/UL


(150-450)  L


 


Mean Platelet Volume


  6.0 FL


(6.5-10.1)  L


 


Neutrophils (%) (Auto)


  % (45.0-75.0)


 


 


Lymphocytes (%) (Auto)


  % (20.0-45.0)


 


 


Monocytes (%) (Auto)  % (1.0-10.0)  


 


Eosinophils (%) (Auto)  % (0.0-3.0)  


 


Basophils (%) (Auto)  % (0.0-2.0)  


 


Differential Total Cells


Counted 100  


 


 


Neutrophils % (Manual) 60 % (45-75)  


 


Lymphocytes % (Manual) 25 % (20-45)  


 


Monocytes % (Manual) 12 % (1-10)  H


 


Eosinophils % (Manual) 3 % (0-3)  


 


Basophils % (Manual) 0 % (0-2)  


 


Band Neutrophils 0 % (0-8)  


 


Platelet Estimate Decreased  L


 


Platelet Morphology Normal  


 


Hypochromasia 1+  


 


Macrocytosis 1+  


 


Sodium Level


  136 MMOL/L


(136-145)


 


Potassium Level


  4.4 MMOL/L


(3.5-5.1)


 


Chloride Level


  106 MMOL/L


()


 


Carbon Dioxide Level


  23 MMOL/L


(21-32)


 


Anion Gap


  7 mmol/L


(5-15)


 


Blood Urea Nitrogen


  25 mg/dL


(7-18)  H


 


Creatinine


  1.0 MG/DL


(0.55-1.30)


 


Estimat Glomerular Filtration


Rate  mL/min (>60)  


 


 


Glucose Level


  124 MG/DL


()  H


 


Calcium Level


  8.1 MG/DL


(8.5-10.1)  L


 


Phosphorus Level


  2.7 MG/DL


(2.5-4.9)


 


Magnesium Level


  1.9 MG/DL


(1.8-2.4)











Microbiology








 Date/Time


Source Procedure


Growth Status


 


 


 2/21/18 21:21


Nasal Nares MRSA Culture - Final


NO METHICILLIN RESISTANT STAPH AUREUS... Complete


 


 2/21/18 21:21


Rectum VRE Culture - Final


NO VANCOMYCIN RESISTANT ENTEROCOCCUS ... Complete








Objective


HEAD AND NECK: No JVD or carotid bruit.


LUNGS:  Clear.


CARDIOVASCULAR:  Regular S1 and S2 with no gallop or murmur.


ABDOMEN:  Soft.


EXTREMITIES:  Showed no pitting edema.  S/P Right hip surgery











NOHELIA OREILLY Feb 24, 2018 14:51

## 2018-02-24 NOTE — CONSULTATION
DATE OF CONSULTATION:  02/22/2018



NOTE:  POOR AUDIO



HEMATOLOGY/ONCOLOGY CONSULTATION



CONSULTING PHYSICIAN:  Gautam Beaulieu M.D.



REQUESTING PHYSICIANS:

1. Pankaj Solo M.D.

2. Pat Ziegler M.D.



REASON FOR CONSULTATION:  Evaluation of anemia.



IDENTIFYING DATA:  Dear Dr. Lira,



The patient is a pleasant 72-year-old male with a past medical history

significant for BPH, COPD, hypertension, possible history of MI, history

of tobacco use in the past, who presented _____ right hip pain.  He fell

in the bathroom.  He was lightheaded, spinning, and had brief loss of

consciousness.  _____ developed anemia.  Upon presentation to the ER,

found to have a right hip fracture.  The patient is being seen by Dr. Mg Charles for further evaluation and treatment with potential surgical

intervention.  Hematology Service was consulted for preoperative clearance

in addition to anemia evaluation.



PAST MEDICAL HISTORY:  Hypertension.



PAST SURGICAL HISTORY:  None available.



MEDICATIONS:  Reviewed.



ALLERGIES:  No known drug allergies that are noted.



REVIEW OF SYSTEMS:  CONSTITUTIONAL:  No fever, chills, or night sweats.

SKIN:  No rashes, bumps, or itching.  HEENT:  No headache, hearing or

vision changes.  BREASTS:  No lumps, pain, or discharge.  PULMONARY:  No

cough, sputum, or shortness of breath.  GASTROINTESTINAL:  No nausea,

vomiting, or diarrhea.  GENITOURINARY:  No dysuria, frequency, or urgency.

MUSCULOSKELETAL:  _____.



PHYSICAL EXAMINATION:

VITAL SIGNS:  Reviewed.

GENERAL:  No acute distress.

PULMONARY:  Decreased breath sounds

CARDIOVASCULAR:  Regular rate.  No S3 or S4.

ABDOMEN:  Soft, nontender, and nondistended.

EXTREMITIES:  _____.



LABORATORY DATA:  WBC 8.4, hemoglobin 11.4, hematocrit 32, and platelet

count 100,000, which has decreased.  BUN of 29 and creatinine 1.1.



ASSESSMENT AND RECOMMENDATIONS:

1. Thrombocytopenia.  Okay to continue with surgery if platelets goes

_____.  Okay to continue Lovenox daily for prophylaxis.  _____  hepatitis

panel and human immunodeficiency virus.

2. Anemia due to underlying chronic disease.  Continue to closely

monitor.  Anemia workup has been ordered.  Results are at this time

pending.

3. Coagulopathy, potentially secondary to liver disease.  Obtain

ultrasound of the abdomen.

4. Anemia due to orthopedic procedure, currently downtrended,

potentially secondary to hemodilution.  Again, continue to closely

monitor.  Evaluate if the patient requires iron.

5. Syncope, probably due to anemia versus vertigo versus other causes.

6. Tobacco use.

7. Alcoholic liver disease.

8. History of cirrhosis.



I appreciate the consultation.









  ______________________________________________

  Gautam Beaulieu M.D.





DR:  Yvette

D:  02/23/2018 15:10

T:  02/24/2018 01:44

JOB#:  [id number]

CC:

## 2018-02-25 VITALS — DIASTOLIC BLOOD PRESSURE: 69 MMHG | SYSTOLIC BLOOD PRESSURE: 114 MMHG

## 2018-02-25 VITALS — SYSTOLIC BLOOD PRESSURE: 106 MMHG | DIASTOLIC BLOOD PRESSURE: 52 MMHG

## 2018-02-25 VITALS — DIASTOLIC BLOOD PRESSURE: 49 MMHG | SYSTOLIC BLOOD PRESSURE: 110 MMHG

## 2018-02-25 VITALS — SYSTOLIC BLOOD PRESSURE: 115 MMHG | DIASTOLIC BLOOD PRESSURE: 51 MMHG

## 2018-02-25 VITALS — SYSTOLIC BLOOD PRESSURE: 110 MMHG | DIASTOLIC BLOOD PRESSURE: 53 MMHG

## 2018-02-25 VITALS — DIASTOLIC BLOOD PRESSURE: 50 MMHG | SYSTOLIC BLOOD PRESSURE: 111 MMHG

## 2018-02-25 LAB
ADD MANUAL DIFF: YES
ALBUMIN SERPL-MCNC: 2.3 G/DL (ref 3.4–5)
ALBUMIN/GLOB SERPL: 0.7 {RATIO} (ref 1–2.7)
ALP SERPL-CCNC: 81 U/L (ref 46–116)
ALT SERPL-CCNC: 13 U/L (ref 12–78)
ANION GAP SERPL CALC-SCNC: 7 MMOL/L (ref 5–15)
AST SERPL-CCNC: 34 U/L (ref 15–37)
BILIRUB SERPL-MCNC: 0.9 MG/DL (ref 0.2–1)
BUN SERPL-MCNC: 22 MG/DL (ref 7–18)
CALCIUM SERPL-MCNC: 8.3 MG/DL (ref 8.5–10.1)
CHLORIDE SERPL-SCNC: 108 MMOL/L (ref 98–107)
CO2 SERPL-SCNC: 22 MMOL/L (ref 21–32)
CREAT SERPL-MCNC: 0.9 MG/DL (ref 0.55–1.3)
ERYTHROCYTE [DISTWIDTH] IN BLOOD BY AUTOMATED COUNT: 14 % (ref 11.6–14.8)
GLOBULIN SER-MCNC: 3.4 G/DL
HCT VFR BLD CALC: 21.8 % (ref 42–52)
HGB BLD-MCNC: 7.7 G/DL (ref 14.2–18)
MCV RBC AUTO: 98 FL (ref 80–99)
PLATELET # BLD: 115 K/UL (ref 150–450)
POTASSIUM SERPL-SCNC: 4.1 MMOL/L (ref 3.5–5.1)
RBC # BLD AUTO: 2.23 M/UL (ref 4.7–6.1)
SODIUM SERPL-SCNC: 137 MMOL/L (ref 136–145)
WBC # BLD AUTO: 8.7 K/UL (ref 4.8–10.8)

## 2018-02-25 RX ADMIN — ASPIRIN SCH MG: 81 TABLET, DELAYED RELEASE ORAL at 09:05

## 2018-02-25 RX ADMIN — TAMSULOSIN HYDROCHLORIDE SCH MG: 0.4 CAPSULE ORAL at 20:41

## 2018-02-25 RX ADMIN — ENOXAPARIN SODIUM SCH MG: 30 INJECTION SUBCUTANEOUS at 09:00

## 2018-02-25 RX ADMIN — CELECOXIB SCH MG: 200 CAPSULE ORAL at 09:05

## 2018-02-25 RX ADMIN — IPRATROPIUM BROMIDE AND ALBUTEROL SULFATE SCH ML: .5; 3 SOLUTION RESPIRATORY (INHALATION) at 01:00

## 2018-02-25 RX ADMIN — IPRATROPIUM BROMIDE AND ALBUTEROL SULFATE SCH ML: .5; 3 SOLUTION RESPIRATORY (INHALATION) at 07:06

## 2018-02-25 RX ADMIN — DOCUSATE SODIUM SCH MG: 100 CAPSULE, LIQUID FILLED ORAL at 09:06

## 2018-02-25 RX ADMIN — IPRATROPIUM BROMIDE AND ALBUTEROL SULFATE SCH ML: .5; 3 SOLUTION RESPIRATORY (INHALATION) at 19:00

## 2018-02-25 RX ADMIN — IPRATROPIUM BROMIDE AND ALBUTEROL SULFATE SCH ML: .5; 3 SOLUTION RESPIRATORY (INHALATION) at 13:00

## 2018-02-25 RX ADMIN — IPRATROPIUM BROMIDE AND ALBUTEROL SULFATE SCH ML: .5; 3 SOLUTION RESPIRATORY (INHALATION) at 20:59

## 2018-02-25 RX ADMIN — DOCUSATE SODIUM SCH MG: 100 CAPSULE, LIQUID FILLED ORAL at 12:22

## 2018-02-25 RX ADMIN — DOCUSATE SODIUM SCH MG: 100 CAPSULE, LIQUID FILLED ORAL at 17:52

## 2018-02-25 NOTE — GENERAL PROGRESS NOTE
Assessment/Plan


Assessment/Plan


#. Anemia due to underlying chronic disease.  


--> Continue to closely monitor.  


--> Anemia workup completed.  Results: Iron 80, TIBC 245, Folate 47.5, B12 441


--> S/P PRBC as hemoglobin levels fell below goal


--> Hemoglobin goal is >7, trend daily. 


#. Thrombocytopenia.  Okay to continue with surgery. 


--> Okay to continue Lovenox daily for prophylaxis.  


---> Negative hepatitis panel and human immunodeficiency virus.


--> Improving. 


#. Coagulopathy, potentially secondary to liver disease.  


--> S/P ab ultrasound, was suboptimal and revealed no significant results 


#. Anemia due to orthopedic procedure, currently downtrended, potentially 

secondary to hemodilution.  


--> Again, continue to closely monitor.  


--> Does not need iron at this time. 


#. Syncope, probably due to anemia versus vertigo versus other causes.


#. Tobacco use.


#. Alcoholic liver disease.


#. History of cirrhosis.





Subjective


Date patient seen:  Feb 25, 2018


Constitutional:  Denies: no symptoms, chills, diaphoresis, fever, malaise, 

weakness, other


HEENT:  Denies: no symptoms, eye pain, blurred vision, tearing, double vision, 

ear pain, ear discharge, nose pain, nose congestion, throat pain, throat 

swelling, mouth pain, mouth swelling, other


Cardiovascular:  Denies: no symptoms, chest pain, edema, irregular heart rate, 

lightheadedness, palpitations, syncope, other


Respiratory:  Denies: no symptoms, cough, orthopnea, shortness of breath, SOB 

with excertion, SOB at rest, sputum, stridor, wheezing, other


Gastrointestinal/Abdominal:  Denies: no symptoms, abdomen distended, abdominal 

pain, black stools, tarry stools, blood in stool, constipated, diarrhea, 

difficulty swallowing, nausea, poor appetite, poor fluid intake, rectal bleeding

, vomiting, other


Genitourinary:  Denies: no symptoms, burning, discharge, frequency, flank pain, 

hematuria, incontinence, pain, urgency, other


Neurologic/Psychiatric:  Denies: no symptoms, anxiety, depressed, emotional 

problems, headache, numbness, paresthesia, pre-existing deficit, seizure, 

tingling, tremors, weakness, other


Allergies:  


Coded Allergies:  


     No Known Allergies (Unverified , 2/21/18)


Subjective


S/P blood transfusion. No adverse events.





Objective





Last 24 Hour Vital Signs








  Date Time  Temp Pulse Resp B/P (MAP) Pulse Ox O2 Delivery O2 Flow Rate FiO2


 


2/25/18 21:00  87 20  95 Nasal Cannula 3.0 32


 


2/25/18 21:00  91 20  98 Nasal Cannula 3.0 32


 


2/25/18 20:00  91      


 


2/25/18 20:00 98.2 90 22 110/53 100 Nasal Cannula 3.0 





 98.2       


 


2/25/18 19:30      Nasal Cannula 3.0 32


 


2/25/18 19:30      Nasal Cannula 3.0 32


 


2/25/18 19:30      Nasal Cannula 3.0 32


 


2/25/18 19:30     97 Nasal Cannula 3.0 32


 


2/25/18 16:00 97.9 88 20 110/49 100 Nasal Cannula 3.0 





 97.9       


 


2/25/18 16:00  91      


 


2/25/18 13:35      Nasal Cannula  


 


2/25/18 13:35      Nasal Cannula  


 


2/25/18 12:00  92      


 


2/25/18 12:00 98.1 88 20 106/52 93 Nasal Cannula 3.0 





 98.1       


 


2/25/18 08:00  93      


 


2/25/18 08:00 99.3 95 20 115/51 98 Nasal Cannula 3.0 





 99.3       


 


2/25/18 07:15  94 20  99 Nasal Cannula 3.0 32


 


2/25/18 07:05  88 18  95 Nasal Cannula 3.0 32


 


2/25/18 07:00     95 Nasal Cannula 3.0 32


 


2/25/18 07:00      Nasal Cannula 3.0 32


 


2/25/18 04:00  89      


 


2/25/18 04:00 97.0 89 20 111/50 91 Nasal Cannula 3.0 





 97.0       


 


2/25/18 01:31      Nasal Cannula 3.0 32


 


2/25/18 01:30      Nasal Cannula 3.0 32


 


2/25/18 00:00 98.1 92 20 114/69 96 Nasal Cannula 3.0 





 98.1       


 


2/25/18 00:00  92      

















Intake and Output  


 


 2/24/18 2/25/18





 19:00 07:00


 


Intake Total 450 ml 28 ml


 


Output Total 600 ml 120 ml


 


Balance -150 ml -92 ml


 


  


 


IV Total 450 ml 28 ml


 


Output Urine Total 600 ml 120 ml


 


# Voids  1








Laboratory Tests


2/25/18 06:50: 


White Blood Count 8.7, Red Blood Count 2.23L, Hemoglobin 7.7L, Hematocrit 21.8L

, Mean Corpuscular Volume 98, Mean Corpuscular Hemoglobin 34.7H, Mean 

Corpuscular Hemoglobin Concent 35.6, Red Cell Distribution Width 14.0, Platelet 

Count 115L, Mean Platelet Volume 5.4L, Neutrophils (%) (Auto) , Lymphocytes (%) 

(Auto) , Monocytes (%) (Auto) , Eosinophils (%) (Auto) , Basophils (%) (Auto) , 

Differential Total Cells Counted 100, Neutrophils % (Manual) 63, Lymphocytes % (

Manual) 27, Monocytes % (Manual) 1, Eosinophils % (Manual) 9H, Basophils % (

Manual) 0, Band Neutrophils 0, Platelet Estimate DecreasedL, Platelet 

Morphology Normal, Polychromasia 1+, Hypochromasia 1+, Anisocytosis 1+, Sodium 

Level 137, Potassium Level 4.1, Chloride Level 108H, Carbon Dioxide Level 22, 

Anion Gap 7, Blood Urea Nitrogen 22H, Creatinine 0.9, Estimat Glomerular 

Filtration Rate , Glucose Level 107H, Calcium Level 8.3L, Total Bilirubin 0.9, 

Aspartate Amino Transf (AST/SGOT) 34, Alanine Aminotransferase (ALT/SGPT) 13, 

Alkaline Phosphatase 81, Total Protein 5.7L, Albumin 2.3L, Globulin 3.4, Albumin

/Globulin Ratio 0.7L


Height (Feet):  5


Height (Inches):  6.00


Weight (Pounds):  135











Gautam Beaulieu Feb 25, 2018 23:13

## 2018-02-25 NOTE — GENERAL PROGRESS NOTE
Assessment/Plan


Assessment/Plan


1. Thrombocytopenia.  Okay to continue with surgery. Okay to continue Lovenox 

daily for prophylaxis.  


Pending hepatitis panel and human immunodeficiency virus.


2. Anemia due to underlying chronic disease.  Continue to closely monitor.  


Anemia workup completed.  Results: Iron 80, TIBC 245, Folate 47.5, B12 441


3. Coagulopathy, potentially secondary to liver disease.  Obtain ultrasound of 

the abdomen.


4. Anemia due to orthopedic procedure, currently downtrended, potentially 

secondary to hemodilution.  


Again, continue to closely monitor.  Evaluate if the patient requires iron.


5. Syncope, probably due to anemia versus vertigo versus other causes.


6. Tobacco use.


7. Alcoholic liver disease.


8. History of cirrhosis.





Date of entry does reflect time patient was seen.





Subjective


Date patient seen:  Feb 23, 2018


Constitutional:  Denies: no symptoms, chills, diaphoresis, fever, malaise, 

weakness, other


HEENT:  Denies: no symptoms, eye pain, blurred vision, tearing, double vision, 

ear pain, ear discharge, nose pain, nose congestion, throat pain, throat 

swelling, mouth pain, mouth swelling, other


Cardiovascular:  Denies: no symptoms, chest pain, edema, irregular heart rate, 

lightheadedness, palpitations, syncope, other


Respiratory:  Denies: no symptoms, cough, orthopnea, shortness of breath, SOB 

with excertion, SOB at rest, sputum, stridor, wheezing, other


Gastrointestinal/Abdominal:  Denies: no symptoms, abdomen distended, abdominal 

pain, black stools, tarry stools, blood in stool, constipated, diarrhea, 

difficulty swallowing, nausea, poor appetite, poor fluid intake, rectal bleeding

, vomiting, other


Genitourinary:  Denies: no symptoms, burning, discharge, frequency, flank pain, 

hematuria, incontinence, pain, urgency, other


Hematologic/Lymphatic:  Reports: anemia


Allergies:  


Coded Allergies:  


     No Known Allergies (Unverified , 2/21/18)


Subjective


Afebrile. On pain management. Hypotensive.





Objective





 VS - Last 72 Hours, by Label








  Date Time  Temp Pulse Resp B/P (MAP) Pulse Ox O2 Delivery O2 Flow Rate FiO2


 


2/25/18 12:00 98.1 88 20 106/52 93 Nasal Cannula 3.0 





 98.1       


 


2/25/18 08:00 99.3 95 20 115/51 98 Nasal Cannula 3.0 





 99.3       


 


2/25/18 07:15  94 20  99 Nasal Cannula 3.0 32


 


2/25/18 07:05  88 18  95 Nasal Cannula 3.0 32


 


2/25/18 07:00     95 Nasal Cannula 3.0 32


 


2/25/18 07:00      Nasal Cannula 3.0 32


 


2/25/18 04:00  89      


 


2/25/18 04:00 97.0 89 20 111/50 91 Nasal Cannula 3.0 





 97.0       


 


2/25/18 01:31      Nasal Cannula 3.0 32


 


2/25/18 01:30      Nasal Cannula 3.0 32


 


2/25/18 00:00 98.1 92 20 114/69 96 Nasal Cannula 3.0 





 98.1       


 


2/25/18 00:00  92      


 


2/24/18 20:00  93      


 


2/24/18 20:00 98.7 85 20 109/74 97 Nasal Cannula 3.0 





 98.7       


 


2/24/18 19:52      Nasal Cannula 3.0 32


 


2/24/18 19:51      Nasal Cannula 3.0 32


 


2/24/18 19:51      Nasal Cannula 3.0 32


 


2/24/18 19:51     98 Nasal Cannula 3.0 32


 


2/24/18 17:57 98.2       


 


2/24/18 16:58 98.4       


 


2/24/18 16:00  93      


 


2/24/18 16:00 98.8 92 20 104/56 99 Nasal Cannula 3.0 





 98.8       


 


2/24/18 14:56  97 18 98/51 98 Nasal Cannula 3.0 


 


2/24/18 12:58  92 20  99 Nasal Cannula 3.0 32


 


2/24/18 12:47  90 18  98 Nasal Cannula 3.0 32


 


2/24/18 12:00 98.5 98 17 99/47 98 Nasal Cannula 3.0 





 98.5       


 


2/24/18 10:30 97.9 98  88/47    





 97.9       


 


2/24/18 10:01 98.3       


 


2/24/18 08:37 98.3       


 


2/24/18 08:00 98.3 99 18 91/55 98 Nasal Cannula 3.0 





 98.3       


 


2/24/18 07:26  90 18  99 Nasal Cannula 3.0 32


 


2/24/18 07:20   20 84/53 97 Nasal Cannula 3.0 


 


2/24/18 07:16      Nasal Cannula 3.0 32


 


2/24/18 07:16     99 Nasal Cannula 3.0 32


 


2/24/18 07:16  91 16  99 Nasal Cannula 3.0 32


 


2/24/18 03:24 97.8 103 16 105/51 98 Nasal Cannula 3.0 





 97.8       


 


2/24/18 01:48  99 18  99 Nasal Cannula 3.0 32


 


2/24/18 01:41  102 16  98 Nasal Cannula 3.0 32


 


2/24/18 00:16 98.0 103 17 99/51 95 Nasal Cannula 3.0 





 98.0       


 


2/23/18 20:16  97 18  99 Nasal Cannula 3.0 32


 


2/23/18 20:06  98 16  98 Nasal Cannula 3.0 32


 


2/23/18 20:05     98 Nasal Cannula 3.0 32


 


2/23/18 20:05      Nasal Cannula 3.0 32


 


2/23/18 19:52 97.4 96 18 100/46 98 Nasal Cannula 3.0 





 97.4       


 


2/23/18 18:39 98.8 96 21 102/59 97   





 98.8       


 


2/23/18 18:16 99.5 95 20 108/40 98 Nasal Cannula 3.0 





 99.5       


 


2/23/18 18:09  92 20 96/40 98 Nasal Cannula 3.0 


 


2/23/18 17:50  87 20 93/53 98 Nasal Cannula 3.0 


 


2/23/18 17:45  93 20 88/58 96 Nasal Cannula 3.0 


 


2/23/18 17:30  92 20 118/66 98 Nasal Cannula 3.0 


 


2/23/18 17:13  87 20 90/42 98 Nasal Cannula 3.0 


 


2/23/18 17:00  82 20 85/41 99 Nasal Cannula 3.0 


 


2/23/18 16:45  82 20 90/41 100 Simple Mask 8.0 


 


2/23/18 16:30  82 20 92/41 100 Simple Mask 8.0 


 


2/23/18 16:15  82 20 100/41 100 Simple Mask 8.0 


 


2/23/18 16:00  81 20 99/51 100 Simple Mask 8.0 


 


2/23/18 15:55  79 20 98/41 100 Simple Mask 8.0 


 


2/23/18 15:45  79 20 99/42 100 Simple Mask 8.0 


 


2/23/18 15:40 97.5 80 20 100/49 100 Simple Mask 8.0 





 97.5       


 


2/23/18 12:00 97.3 86 18 102/54 95 Nasal Cannula  





 97.3       


 


2/23/18 12:00  96      


 


2/23/18 11:38  87 18  99 Nasal Cannula 3.0 32


 


2/23/18 11:29  88 16  96 Nasal Cannula 3.0 32


 


2/23/18 10:55 98.7       


 


2/23/18 10:25 98.7       


 


2/23/18 08:00  80      


 


2/23/18 08:00 98.5 84 18 111/55 95 Nasal Cannula  





 98.5       


 


2/23/18 06:51  94 20  95 Nasal Cannula 3.0 32


 


2/23/18 06:41     93 Nasal Cannula 3.0 32


 


2/23/18 06:41      Nasal Cannula 3.0 32


 


2/23/18 06:41  92 18  93 Nasal Cannula 3.0 32


 


2/23/18 04:18 98.7       


 


2/23/18 04:00 98.7 90 23 101/47 93 Nasal Cannula  





 98.7       


 


2/23/18 04:00  82      


 


2/23/18 02:13  84 20  93 Nasal Cannula 3.0 32


 


2/23/18 02:03  90 22  93 Nasal Cannula 3.0 32


 


2/23/18 00:00 98.9 64 20 97/52 91 Nasal Cannula  





 98.9       


 


2/23/18 00:00  74      


 


2/22/18 21:52 97.7       


 


2/22/18 20:00  85      


 


2/22/18 20:00 97.7 79 24 93/55 98 Nasal Cannula  





 97.7       


 


2/22/18 19:48  78 18  94 Nasal Cannula 3.0 32


 


2/22/18 19:39      Nasal Cannula 2.0 28


 


2/22/18 19:39  74 18  92 Nasal Cannula 2.0 28


 


2/22/18 19:37     93 Nasal Cannula 2.0 28


 


2/22/18 17:07 97.0       


 


2/22/18 16:08 97.0       


 


2/22/18 16:00 98.1 76 18 94/54 97 Nasal Cannula  





 98.1       


 


2/22/18 16:00  70      


 


2/22/18 15:40  78 22  94 Nasal Cannula 2.0 28


 


2/22/18 15:30  75 22   Nasal Cannula 3.0 32


 


2/22/18 15:30  75 22  98 Nasal Cannula 3.0 32








Last 24 Hour Vital Signs








  Date Time  Temp Pulse Resp B/P (MAP) Pulse Ox O2 Delivery O2 Flow Rate FiO2


 


2/25/18 12:00 98.1 88 20 106/52 93 Nasal Cannula 3.0 





 98.1       


 


2/25/18 08:00 99.3 95 20 115/51 98 Nasal Cannula 3.0 





 99.3       


 


2/25/18 07:15  94 20  99 Nasal Cannula 3.0 32


 


2/25/18 07:05  88 18  95 Nasal Cannula 3.0 32


 


2/25/18 07:00     95 Nasal Cannula 3.0 32


 


2/25/18 07:00      Nasal Cannula 3.0 32


 


2/25/18 04:00  89      


 


2/25/18 04:00 97.0 89 20 111/50 91 Nasal Cannula 3.0 





 97.0       


 


2/25/18 01:31      Nasal Cannula 3.0 32


 


2/25/18 01:30      Nasal Cannula 3.0 32


 


2/25/18 00:00 98.1 92 20 114/69 96 Nasal Cannula 3.0 





 98.1       


 


2/25/18 00:00  92      


 


2/24/18 20:00  93      


 


2/24/18 20:00 98.7 85 20 109/74 97 Nasal Cannula 3.0 





 98.7       


 


2/24/18 19:52      Nasal Cannula 3.0 32


 


2/24/18 19:51      Nasal Cannula 3.0 32


 


2/24/18 19:51      Nasal Cannula 3.0 32


 


2/24/18 19:51     98 Nasal Cannula 3.0 32


 


2/24/18 17:57 98.2       


 


2/24/18 16:58 98.4       


 


2/24/18 16:00  93      


 


2/24/18 16:00 98.8 92 20 104/56 99 Nasal Cannula 3.0 





 98.8       


 


2/24/18 14:56  97 18 98/51 98 Nasal Cannula 3.0 


 


2/24/18 12:58  92 20  99 Nasal Cannula 3.0 32


 


2/24/18 12:47  90 18  98 Nasal Cannula 3.0 32

















Intake and Output  


 


 2/24/18 2/25/18





 19:00 07:00


 


Intake Total 450 ml 28 ml


 


Output Total 600 ml 120 ml


 


Balance -150 ml -92 ml


 


  


 


IV Total 450 ml 28 ml


 


Output Urine Total 600 ml 120 ml


 


# Voids  1











Labs








Test


  2/22/18


12:30 2/23/18


06:50 2/24/18


06:40 2/25/18


06:50


 


Troponin I


  0.004 ng/mL


(0.000-0.056) 


  


  


 


 


White Blood Count


  


  8.0 K/UL


(4.8-10.8) 8.6 K/UL


(4.8-10.8) 8.7 K/UL


(4.8-10.8)


 


Red Blood Count


  


  2.86 M/UL


(4.70-6.10) 2.05 M/UL


(4.70-6.10) 2.23 M/UL


(4.70-6.10)


 


Hemoglobin


  


  9.9 G/DL


(14.2-18.0) 7.2 G/DL


(14.2-18.0) 7.7 G/DL


(14.2-18.0)


 


Hematocrit


  


  28.5 %


(42.0-52.0) 20.4 %


(42.0-52.0) 21.8 %


(42.0-52.0)


 


Mean Corpuscular Volume  100 FL (80-99)  100 FL (80-99)  98 FL (80-99) 


 


Mean Corpuscular Hemoglobin


  


  34.8 PG


(27.0-31.0) 34.9 PG


(27.0-31.0) 34.7 PG


(27.0-31.0)


 


Mean Corpuscular Hemoglobin


Concent 


  34.9 G/DL


(32.0-36.0) 35.1 G/DL


(32.0-36.0) 35.6 G/DL


(32.0-36.0)


 


Red Cell Distribution Width


  


  12.6 %


(11.6-14.8) 12.3 %


(11.6-14.8) 14.0 %


(11.6-14.8)


 


Platelet Count


  


  88 K/UL


(150-450) 92 K/UL


(150-450) 115 K/UL


(150-450)


 


Mean Platelet Volume


  


  5.9 FL


(6.5-10.1) 6.0 FL


(6.5-10.1) 5.4 FL


(6.5-10.1)


 


Neutrophils (%) (Auto)   % (45.0-75.0)   % (45.0-75.0)   % (45.0-75.0) 


 


Lymphocytes (%) (Auto)   % (20.0-45.0)   % (20.0-45.0)   % (20.0-45.0) 


 


Monocytes (%) (Auto)   % (1.0-10.0)   % (1.0-10.0)   % (1.0-10.0) 


 


Eosinophils (%) (Auto)   % (0.0-3.0)   % (0.0-3.0)   % (0.0-3.0) 


 


Basophils (%) (Auto)   % (0.0-2.0)   % (0.0-2.0)   % (0.0-2.0) 


 


Differential Total Cells


Counted 


  100 


 


 


Neutrophils % (Manual)  56 % (45-75)  60 % (45-75)  63 % (45-75) 


 


Lymphocytes % (Manual)  26 % (20-45)  25 % (20-45)  27 % (20-45) 


 


Monocytes % (Manual)  17 % (1-10)  12 % (1-10)  1 % (1-10) 


 


Eosinophils % (Manual)  1 % (0-3)  3 % (0-3)  9 % (0-3) 


 


Basophils % (Manual)  0 % (0-2)  0 % (0-2)  0 % (0-2) 


 


Band Neutrophils  0 % (0-8)  0 % (0-8)  0 % (0-8) 


 


Platelet Estimate  Decreased  Decreased  Decreased 


 


Platelet Morphology  Normal  Normal  Normal 


 


Hypochromasia  2+  1+  1+ 


 


Spherocytes  1+   


 


Reticulocyte Count


  


  1.0 %


(0.0-2.0) 


  


 


 


Sodium Level


  


  135 MMOL/L


(136-145) 136 MMOL/L


(136-145) 137 MMOL/L


(136-145)


 


Potassium Level


  


  4.8 MMOL/L


(3.5-5.1) 4.4 MMOL/L


(3.5-5.1) 4.1 MMOL/L


(3.5-5.1)


 


Chloride Level


  


  104 MMOL/L


() 106 MMOL/L


() 108 MMOL/L


()


 


Carbon Dioxide Level


  


  25 MMOL/L


(21-32) 23 MMOL/L


(21-32) 22 MMOL/L


(21-32)


 


Anion Gap


  


  6 mmol/L


(5-15) 7 mmol/L


(5-15) 7 mmol/L


(5-15)


 


Blood Urea Nitrogen


  


  29 mg/dL


(7-18) 25 mg/dL


(7-18) 22 mg/dL


(7-18)


 


Creatinine


  


  1.1 MG/DL


(0.55-1.30) 1.0 MG/DL


(0.55-1.30) 0.9 MG/DL


(0.55-1.30)


 


Estimat Glomerular Filtration


Rate 


   mL/min (>60) 


 


 


Glucose Level


  


  107 MG/DL


() 124 MG/DL


() 107 MG/DL


()


 


Calcium Level


  


  8.9 MG/DL


(8.5-10.1) 8.1 MG/DL


(8.5-10.1) 8.3 MG/DL


(8.5-10.1)


 


Iron Level


  


  80 ug/dL


() 


  


 


 


Total Iron Binding Capacity


  


  245 ug/dL


(250-450) 


  


 


 


Percent Iron Saturation  33 % (15-50)   


 


Unsaturated Iron Binding


  


  165 ug/dL


(112-346) 


  


 


 


Ferritin


  


  209 NG/ML


(8-388) 


  


 


 


Folate


  


  47.5 NG/ML


(8.6-58.9) 


  


 


 


Hepatitis A IgM Antibody


  


  Negative


(Negative) 


  


 


 


Hepatitis B Surface Antigen


  


  Negative


(Negative) 


  


 


 


Hepatitis B Core IgM Antibody


  


  Negative


(Negative) 


  


 


 


Hepatitis C Antibody


  


  0.1 s/co ratio


(0.0-0.9) 


  


 


 


HIV (1&2) Antibody Rapid


  


  Negative


(NEGATIVE) 


  


 


 


Macrocytosis   1+  


 


Phosphorus Level


  


  


  2.7 MG/DL


(2.5-4.9) 


 


 


Magnesium Level


  


  


  1.9 MG/DL


(1.8-2.4) 


 


 


Polychromasia    1+ 


 


Anisocytosis    1+ 


 


Total Bilirubin


  


  


  


  0.9 MG/DL


(0.2-1.0)


 


Aspartate Amino Transf


(AST/SGOT) 


  


  


  34 U/L (15-37) 


 


 


Alanine Aminotransferase


(ALT/SGPT) 


  


  


  13 U/L (12-78) 


 


 


Alkaline Phosphatase


  


  


  


  81 U/L


()


 


Total Protein


  


  


  


  5.7 G/DL


(6.4-8.2)


 


Albumin


  


  


  


  2.3 G/DL


(3.4-5.0)


 


Globulin    3.4 g/dL 


 


Albumin/Globulin Ratio    0.7 (1.0-2.7) 





Laboratory Tests


2/25/18 06:50: 


White Blood Count 8.7, Red Blood Count 2.23L, Hemoglobin 7.7L, Hematocrit 21.8L

, Mean Corpuscular Volume 98, Mean Corpuscular Hemoglobin 34.7H, Mean 

Corpuscular Hemoglobin Concent 35.6, Red Cell Distribution Width 14.0, Platelet 

Count 115L, Mean Platelet Volume 5.4L, Neutrophils (%) (Auto) , Lymphocytes (%) 

(Auto) , Monocytes (%) (Auto) , Eosinophils (%) (Auto) , Basophils (%) (Auto) , 

Differential Total Cells Counted 100, Neutrophils % (Manual) 63, Lymphocytes % (

Manual) 27, Monocytes % (Manual) 1, Eosinophils % (Manual) 9H, Basophils % (

Manual) 0, Band Neutrophils 0, Platelet Estimate DecreasedL, Platelet 

Morphology Normal, Polychromasia 1+, Hypochromasia 1+, Anisocytosis 1+, Sodium 

Level 137, Potassium Level 4.1, Chloride Level 108H, Carbon Dioxide Level 22, 

Anion Gap 7, Blood Urea Nitrogen 22H, Creatinine 0.9, Estimat Glomerular 

Filtration Rate , Glucose Level 107H, Calcium Level 8.3L, Total Bilirubin 0.9, 

Aspartate Amino Transf (AST/SGOT) 34, Alanine Aminotransferase (ALT/SGPT) 13, 

Alkaline Phosphatase 81, Total Protein 5.7L, Albumin 2.3L, Globulin 3.4, Albumin

/Globulin Ratio 0.7L


Height (Feet):  5


Height (Inches):  6.00


Weight (Pounds):  135


General Appearance:  confused


Respiratory/Chest:  decreased breath sounds


Edema:  trace edema











Gautam Beaulieu Feb 25, 2018 12:25

## 2018-02-25 NOTE — CARDIAC ELECTROPHYSIOLOGY PN
Assessment/Plan


Assessment/Plan


1. Status post fall versus syncope. Ruled out for myocardial infarction.   EF 65

%


2. History of asthma, on albuterol.


3. Right hip fracture. S/P ORIF by Dr Edmonds. No cardiac events 


4. Hypotension.No further hypotension. 


5. Anemia Hb dropped form 9 to 7.2. Got 1 unit prbc. Getting another unit prbc 

today


6. Short burst of atrial fib


7. Short AIVR








DW RN and Fiancee at bedside





Subjective


Subjective


Comfortable in NAD.Transferred to telemetry. Fiancee at bedside.Had PRBC 

yesterday and getting another unit today. Had short run of atrial fib and AIVR





Objective





Last 24 Hour Vital Signs








  Date Time  Temp Pulse Resp B/P (MAP) Pulse Ox O2 Delivery O2 Flow Rate FiO2


 


2/25/18 12:00 98.1 88 20 106/52 93 Nasal Cannula 3.0 





 98.1       


 


2/25/18 08:00 99.3 95 20 115/51 98 Nasal Cannula 3.0 





 99.3       


 


2/25/18 07:15  94 20  99 Nasal Cannula 3.0 32


 


2/25/18 07:05  88 18  95 Nasal Cannula 3.0 32


 


2/25/18 07:00     95 Nasal Cannula 3.0 32


 


2/25/18 07:00      Nasal Cannula 3.0 32


 


2/25/18 04:00  89      


 


2/25/18 04:00 97.0 89 20 111/50 91 Nasal Cannula 3.0 





 97.0       


 


2/25/18 01:31      Nasal Cannula 3.0 32


 


2/25/18 01:30      Nasal Cannula 3.0 32


 


2/25/18 00:00 98.1 92 20 114/69 96 Nasal Cannula 3.0 





 98.1       


 


2/25/18 00:00  92      


 


2/24/18 20:00  93      


 


2/24/18 20:00 98.7 85 20 109/74 97 Nasal Cannula 3.0 





 98.7       


 


2/24/18 19:52      Nasal Cannula 3.0 32


 


2/24/18 19:51      Nasal Cannula 3.0 32


 


2/24/18 19:51      Nasal Cannula 3.0 32


 


2/24/18 19:51     98 Nasal Cannula 3.0 32


 


2/24/18 17:57 98.2       


 


2/24/18 16:58 98.4       


 


2/24/18 16:00  93      


 


2/24/18 16:00 98.8 92 20 104/56 99 Nasal Cannula 3.0 





 98.8       


 


2/24/18 14:56  97 18 98/51 98 Nasal Cannula 3.0 

















Intake and Output  


 


 2/24/18 2/25/18





 19:00 07:00


 


Intake Total 450 ml 28 ml


 


Output Total 600 ml 120 ml


 


Balance -150 ml -92 ml


 


  


 


IV Total 450 ml 28 ml


 


Output Urine Total 600 ml 120 ml


 


# Voids  1











Laboratory Tests








Test


  2/25/18


06:50


 


White Blood Count


  8.7 K/UL


(4.8-10.8)


 


Red Blood Count


  2.23 M/UL


(4.70-6.10)  L


 


Hemoglobin


  7.7 G/DL


(14.2-18.0)  L


 


Hematocrit


  21.8 %


(42.0-52.0)  L


 


Mean Corpuscular Volume 98 FL (80-99)  


 


Mean Corpuscular Hemoglobin


  34.7 PG


(27.0-31.0)  H


 


Mean Corpuscular Hemoglobin


Concent 35.6 G/DL


(32.0-36.0)


 


Red Cell Distribution Width


  14.0 %


(11.6-14.8)


 


Platelet Count


  115 K/UL


(150-450)  L


 


Mean Platelet Volume


  5.4 FL


(6.5-10.1)  L


 


Neutrophils (%) (Auto)


  % (45.0-75.0)


 


 


Lymphocytes (%) (Auto)


  % (20.0-45.0)


 


 


Monocytes (%) (Auto)  % (1.0-10.0)  


 


Eosinophils (%) (Auto)  % (0.0-3.0)  


 


Basophils (%) (Auto)  % (0.0-2.0)  


 


Differential Total Cells


Counted 100  


 


 


Neutrophils % (Manual) 63 % (45-75)  


 


Lymphocytes % (Manual) 27 % (20-45)  


 


Monocytes % (Manual) 1 % (1-10)  


 


Eosinophils % (Manual) 9 % (0-3)  H


 


Basophils % (Manual) 0 % (0-2)  


 


Band Neutrophils 0 % (0-8)  


 


Platelet Estimate Decreased  L


 


Platelet Morphology Normal  


 


Polychromasia 1+  


 


Hypochromasia 1+  


 


Anisocytosis 1+  


 


Sodium Level


  137 MMOL/L


(136-145)


 


Potassium Level


  4.1 MMOL/L


(3.5-5.1)


 


Chloride Level


  108 MMOL/L


()  H


 


Carbon Dioxide Level


  22 MMOL/L


(21-32)


 


Anion Gap


  7 mmol/L


(5-15)


 


Blood Urea Nitrogen


  22 mg/dL


(7-18)  H


 


Creatinine


  0.9 MG/DL


(0.55-1.30)


 


Estimat Glomerular Filtration


Rate  mL/min (>60)  


 


 


Glucose Level


  107 MG/DL


()  H


 


Calcium Level


  8.3 MG/DL


(8.5-10.1)  L


 


Total Bilirubin


  0.9 MG/DL


(0.2-1.0)


 


Aspartate Amino Transf


(AST/SGOT) 34 U/L (15-37)


 


 


Alanine Aminotransferase


(ALT/SGPT) 13 U/L (12-78)


 


 


Alkaline Phosphatase


  81 U/L


()


 


Total Protein


  5.7 G/DL


(6.4-8.2)  L


 


Albumin


  2.3 G/DL


(3.4-5.0)  L


 


Globulin 3.4 g/dL  


 


Albumin/Globulin Ratio


  0.7 (1.0-2.7)


L








Objective


HEAD AND NECK: No JVD or carotid bruit.


LUNGS:  Clear.


CARDIOVASCULAR:  Regular S1 and S2 with no gallop or murmur.


ABDOMEN:  Soft.


EXTREMITIES:  Showed no pitting edema.  S/P Right hip surgery











NOHELIA OREILLY Feb 25, 2018 13:26

## 2018-02-25 NOTE — CARDIOLOGY REPORT
--------------- APPROVED REPORT --------------





EKG Measurement

Heart [id number]

IA 182P

[id number]

[id number]

[id number]





Normal sinus rhythm

Cannot rule out Anterior infarct, age undetermined

Abnormal ECG

## 2018-02-25 NOTE — GENERAL PROGRESS NOTE
Assessment/Plan


Problem List:  


(1) Acute blood loss anemia


ICD Codes:  D62 - Acute posthemorrhagic anemia


SNOMED:  449268603


(2) SHADY (acute kidney injury)


ICD Codes:  N17.9 - Acute kidney failure, unspecified


SNOMED:  00180874


(3) Fall


ICD Codes:  W19.XXXA - Unspecified fall, initial encounter


SNOMED:  8602812, 155480498


Qualifiers:  


   Qualified Codes:  W19.XXXA - Unspecified fall, initial encounter


(4) Right hip pain


ICD Codes:  M25.551 - Pain in right hip


SNOMED:  02992439


(5) Closed right hip fracture


ICD Codes:  S72.001A - Fracture of unspecified part of neck of right femur, 

initial encounter for closed fracture


SNOMED:  247826103


Qualifiers:  


   Qualified Codes:  S72.001A - Fracture of unspecified part of neck of right 

femur, initial encounter for closed fracture


(6) COPD (chronic obstructive pulmonary disease)


ICD Codes:  J44.9 - Chronic obstructive pulmonary disease, unspecified


SNOMED:  65327617


(7) CAD (coronary artery disease)


ICD Codes:  I25.10 - Atherosclerotic heart disease of native coronary artery 

without angina pectoris


SNOMED:  74809894


(8) Tobacco abuse


ICD Codes:  Z72.0 - Tobacco use


SNOMED:  334363942


(9) HTN (hypertension)


ICD Codes:  I10 - Essential (primary) hypertension


SNOMED:  66019272


(10) HLD (hyperlipidemia)


ICD Codes:  E78.5 - Hyperlipidemia, unspecified


SNOMED:  82955310


(11) BPH (benign prostatic hyperplasia)


ICD Codes:  N40.0 - Benign prostatic hyperplasia without lower urinary tract 

symptoms


SNOMED:  082313710


(12) Fracture of right hip


ICD Codes:  S72.001A - Fracture of unspecified part of neck of right femur, 

initial encounter for closed fracture


SNOMED:  936616456


(13) Syncope


ICD Codes:  R55 - Syncope and collapse


SNOMED:  575348710


(14) PVD (peripheral vascular disease)


ICD Codes:  I73.9 - Peripheral vascular disease, unspecified


SNOMED:  663113093


(15) Hyponatremia


ICD Codes:  E87.1 - Hypo-osmolality and hyponatremia


SNOMED:  75955904


(16) Alcoholic liver disease


ICD Codes:  K70.9 - Alcoholic liver disease, unspecified


SNOMED:  94469314


(17) [facility]


Status:  stable


Assessment/Plan








s/p R ORIF, POD#2


s/p 2 units pRBC 


lovenox 30mg subq x 10 days 


Ortho consulted


NWB RLE


PT eval and treat


Pain control, bowel regimen


Cardiology consulted


Trop neg, TTE--EF wnl 65%


F/u carotid duplex


F/u abdominal U/S 


IVFs for SHADY


Cont home meds but hold home lasix and aldactone for now given SHADY





DVT Prophylaxis:   SCD


Code Status:            Full


Hospital Classification Declaration: Based on this initial evaluation, and 

depending on the patient's clinical course, I anticipate that this patient will 

require hospitalization for 1-2 days for R hip fracture, syncope, SHADY and close 

respiratory/hemodynamic monitoring.





Disposition: Once the patient is stable to leave the hospital, I anticipate the 

patient will likely be discharged to the following environment: SNF vs ARU





Discussed with patient/family, nursing staff, SW/CM, ortho, cardiology 

regarding clinical status, treatment course, and disposition planning.





Time of note may not reflect time of encounter.





Subjective


Date patient seen:  Feb 25, 2018


Time patient seen:  12:00


Allergies:  


Coded Allergies:  


     No Known Allergies (Unverified , 2/21/18)


Subjective


- s/p right ORIF POD#2


- s/p 2 units pRBC 


- denies dizziness, chest pain, sob, palpitations, n/v, abdominal pain


- pain well controlled 


- denies flatus


- afebrile





Objective





Last 24 Hour Vital Signs








  Date Time  Temp Pulse Resp B/P (MAP) Pulse Ox O2 Delivery O2 Flow Rate FiO2


 


2/25/18 21:00  87 20  95 Nasal Cannula 3.0 32


 


2/25/18 21:00  91 20  98 Nasal Cannula 3.0 32


 


2/25/18 20:00  91      


 


2/25/18 20:00 98.2 90 22 110/53 100 Nasal Cannula 3.0 





 98.2       


 


2/25/18 19:30      Nasal Cannula 3.0 32


 


2/25/18 19:30      Nasal Cannula 3.0 32


 


2/25/18 19:30      Nasal Cannula 3.0 32


 


2/25/18 19:30     97 Nasal Cannula 3.0 32


 


2/25/18 16:00 97.9 88 20 110/49 100 Nasal Cannula 3.0 





 97.9       


 


2/25/18 16:00  91      


 


2/25/18 13:35      Nasal Cannula  


 


2/25/18 13:35      Nasal Cannula  


 


2/25/18 12:00  92      


 


2/25/18 12:00 98.1 88 20 106/52 93 Nasal Cannula 3.0 





 98.1       


 


2/25/18 08:00  93      


 


2/25/18 08:00 99.3 95 20 115/51 98 Nasal Cannula 3.0 





 99.3       


 


2/25/18 07:15  94 20  99 Nasal Cannula 3.0 32


 


2/25/18 07:05  88 18  95 Nasal Cannula 3.0 32


 


2/25/18 07:00     95 Nasal Cannula 3.0 32


 


2/25/18 07:00      Nasal Cannula 3.0 32


 


2/25/18 04:00  89      


 


2/25/18 04:00 97.0 89 20 111/50 91 Nasal Cannula 3.0 





 97.0       


 


2/25/18 01:31      Nasal Cannula 3.0 32


 


2/25/18 01:30      Nasal Cannula 3.0 32


 


2/25/18 00:00 98.1 92 20 114/69 96 Nasal Cannula 3.0 





 98.1       


 


2/25/18 00:00  92      

















Intake and Output  


 


 2/24/18 2/25/18





 19:00 07:00


 


Intake Total 450 ml 28 ml


 


Output Total 600 ml 120 ml


 


Balance -150 ml -92 ml


 


  


 


IV Total 450 ml 28 ml


 


Output Urine Total 600 ml 120 ml


 


# Voids  1








Laboratory Tests


2/25/18 06:50: 


White Blood Count 8.7, Red Blood Count 2.23L, Hemoglobin 7.7L, Hematocrit 21.8L

, Mean Corpuscular Volume 98, Mean Corpuscular Hemoglobin 34.7H, Mean 

Corpuscular Hemoglobin Concent 35.6, Red Cell Distribution Width 14.0, Platelet 

Count 115L, Mean Platelet Volume 5.4L, Neutrophils (%) (Auto) , Lymphocytes (%) 

(Auto) , Monocytes (%) (Auto) , Eosinophils (%) (Auto) , Basophils (%) (Auto) , 

Differential Total Cells Counted 100, Neutrophils % (Manual) 63, Lymphocytes % (

Manual) 27, Monocytes % (Manual) 1, Eosinophils % (Manual) 9H, Basophils % (

Manual) 0, Band Neutrophils 0, Platelet Estimate DecreasedL, Platelet 

Morphology Normal, Polychromasia 1+, Hypochromasia 1+, Anisocytosis 1+, Sodium 

Level 137, Potassium Level 4.1, Chloride Level 108H, Carbon Dioxide Level 22, 

Anion Gap 7, Blood Urea Nitrogen 22H, Creatinine 0.9, Estimat Glomerular 

Filtration Rate , Glucose Level 107H, Calcium Level 8.3L, Total Bilirubin 0.9, 

Aspartate Amino Transf (AST/SGOT) 34, Alanine Aminotransferase (ALT/SGPT) 13, 

Alkaline Phosphatase 81, Total Protein 5.7L, Albumin 2.3L, Globulin 3.4, Albumin

/Globulin Ratio 0.7L


Height (Feet):  5


Height (Inches):  6.00


Weight (Pounds):  135


General Appearance:  no apparent distress, alert


EENT:  PERRL/EOMI


Neck:  non-tender, normal alignment, supple


Cardiovascular:  normal peripheral pulses, normal rate, regular rhythm


Respiratory/Chest:  chest wall non-tender, lungs clear, normal breath sounds


Abdomen:  normal bowel sounds, non tender, soft


Neurologic:  CNs II-XII grossly normal, no motor/sensory deficits, alert, 

oriented x 3











Janet Win N.P. Feb 25, 2018 23:20

## 2018-02-26 VITALS — DIASTOLIC BLOOD PRESSURE: 59 MMHG | SYSTOLIC BLOOD PRESSURE: 118 MMHG

## 2018-02-26 VITALS — SYSTOLIC BLOOD PRESSURE: 104 MMHG | DIASTOLIC BLOOD PRESSURE: 54 MMHG

## 2018-02-26 VITALS — DIASTOLIC BLOOD PRESSURE: 47 MMHG | SYSTOLIC BLOOD PRESSURE: 106 MMHG

## 2018-02-26 VITALS — SYSTOLIC BLOOD PRESSURE: 114 MMHG | DIASTOLIC BLOOD PRESSURE: 54 MMHG

## 2018-02-26 VITALS — DIASTOLIC BLOOD PRESSURE: 55 MMHG | SYSTOLIC BLOOD PRESSURE: 101 MMHG

## 2018-02-26 LAB
ADD MANUAL DIFF: NO
ANION GAP SERPL CALC-SCNC: 8 MMOL/L (ref 5–15)
BASOPHILS NFR BLD AUTO: 1.2 % (ref 0–2)
BUN SERPL-MCNC: 15 MG/DL (ref 7–18)
CALCIUM SERPL-MCNC: 8.3 MG/DL (ref 8.5–10.1)
CHLORIDE SERPL-SCNC: 106 MMOL/L (ref 98–107)
CO2 SERPL-SCNC: 23 MMOL/L (ref 21–32)
CREAT SERPL-MCNC: 0.7 MG/DL (ref 0.55–1.3)
EOSINOPHIL NFR BLD AUTO: 8.8 % (ref 0–3)
ERYTHROCYTE [DISTWIDTH] IN BLOOD BY AUTOMATED COUNT: 14.4 % (ref 11.6–14.8)
HCT VFR BLD CALC: 25.7 % (ref 42–52)
HGB BLD-MCNC: 9 G/DL (ref 14.2–18)
LYMPHOCYTES NFR BLD AUTO: 16.8 % (ref 20–45)
MCV RBC AUTO: 98 FL (ref 80–99)
MONOCYTES NFR BLD AUTO: 12.2 % (ref 1–10)
NEUTROPHILS NFR BLD AUTO: 61.1 % (ref 45–75)
PLATELET # BLD: 131 K/UL (ref 150–450)
POTASSIUM SERPL-SCNC: 3.7 MMOL/L (ref 3.5–5.1)
RBC # BLD AUTO: 2.61 M/UL (ref 4.7–6.1)
SODIUM SERPL-SCNC: 137 MMOL/L (ref 136–145)
WBC # BLD AUTO: 8.2 K/UL (ref 4.8–10.8)

## 2018-02-26 RX ADMIN — DOCUSATE SODIUM SCH MG: 100 CAPSULE, LIQUID FILLED ORAL at 08:28

## 2018-02-26 RX ADMIN — CELECOXIB SCH MG: 200 CAPSULE ORAL at 08:31

## 2018-02-26 RX ADMIN — IPRATROPIUM BROMIDE AND ALBUTEROL SULFATE SCH ML: .5; 3 SOLUTION RESPIRATORY (INHALATION) at 13:15

## 2018-02-26 RX ADMIN — IPRATROPIUM BROMIDE AND ALBUTEROL SULFATE SCH ML: .5; 3 SOLUTION RESPIRATORY (INHALATION) at 07:52

## 2018-02-26 RX ADMIN — ASPIRIN SCH MG: 81 TABLET, DELAYED RELEASE ORAL at 08:28

## 2018-02-26 RX ADMIN — IPRATROPIUM BROMIDE AND ALBUTEROL SULFATE SCH ML: .5; 3 SOLUTION RESPIRATORY (INHALATION) at 01:00

## 2018-02-26 RX ADMIN — DOCUSATE SODIUM SCH MG: 100 CAPSULE, LIQUID FILLED ORAL at 17:12

## 2018-02-26 RX ADMIN — DOCUSATE SODIUM SCH MG: 100 CAPSULE, LIQUID FILLED ORAL at 12:28

## 2018-02-26 RX ADMIN — ENOXAPARIN SODIUM SCH MG: 30 INJECTION SUBCUTANEOUS at 08:34

## 2018-02-26 NOTE — DISCHARGE SUMMARY
Discharge Summary


Hospital Course


Date of Admission


Feb 21, 2018 at 18:45


Date of Discharge


2/26/18


Admitting Diagnosis


right hip fracture/SYNCOPE


Reason for Hospitalization:  R hip fracfture, syncope


HPI


71y/o male with pmh of CAD, HTN, PVD, COPD, BPH, tobacco abuse who presents 

with R hip pain after a fall. Pt states he was in bathroom and felt lightheaded 

and the room spinning. He then fell to the floor and had brief LOC. He states 

when he awoke he had full recollection of events. He landed on R side. He couldn

't move or stand up w/o assistance. Denies f/c, n/v, d/c, chest pain, SOB. Pt 

states he can walk several blocks and at least a flight of stairs. He walks on 

own and sometimes uses a cane. Activity limited by leg pain/weakness. 





In ED, pt found to have R hip fracture.


Consultations


Orthopedic surgery, Cardiology


Procedures


R hip ORIF on 2/23/18


Hospital Course


Pt was admitted to [facility]. He was ruled out for ACS with serial trop/EKG. Syncope 

likely 2/2 orthostatic hypotension in setting of lasix and aldactone use so 

these were held. Pt underwent R hip ORIF on 2/23/18. Course complicated by 

acute blood loss anemia required pRBC transfusion. Hypotension improved with 

IVFs. Once pain controlled, tolerating PO and hgb stable, pt was discharged to 

ARU.





Discharge physical exam:


General: alert, cooperative, no distress, appears stated age


Head: normocephalic, without obvious abnormality, atraumatic


Eyes: conjunctivae/corneas clear. PERRL, EOM's intact


Throat: lips, mucosa, and tongue normal. MMM


Neck: supple, symmetrical, trachea midline, and no JVD


Lungs: clear to auscultation bilaterally


Heart: regular rate and rhythm, S1, S2 normal, no murmur, click, rub or gallop


Abdomen: soft, non-tender, non-distended, bowel sounds normal


Extremities: extremities normal, atraumatic, no cyanosis or edema


Pulses: 2+ and symmetric


Skin: skin color, texture, turgor normal;dressing c/d/i


Neurologic: grossly normal, no focal deficits





Discharge diagnoses:


(1) Fracture of right hip


ICD Codes:  S72.001A - Fracture of unspecified part of neck of right femur, 

initial encounter for closed fracture


SNOMED:  868709194


(2) Syncope


ICD Codes:  R55 - Syncope and collapse


SNOMED:  678478970


(3) SHADY (acute kidney injury)


ICD Codes:  N17.9 - Acute kidney failure, unspecified


SNOMED:  32216765


(4) Hyponatremia


ICD Codes:  E87.1 - Hypo-osmolality and hyponatremia


SNOMED:  84038615


(5) CAD (coronary artery disease)


ICD Codes:  I25.10 - Atherosclerotic heart disease of native coronary artery 

without angina pectoris


SNOMED:  53863840


(6) COPD (chronic obstructive pulmonary disease)


ICD Codes:  J44.9 - Chronic obstructive pulmonary disease, unspecified


SNOMED:  18624922


(7) HTN (hypertension)


ICD Codes:  I10 - Essential (primary) hypertension


SNOMED:  06618573


(8) HLD (hyperlipidemia)


ICD Codes:  E78.5 - Hyperlipidemia, unspecified


SNOMED:  03335990


(9) BPH (benign prostatic hyperplasia)


ICD Codes:  N40.0 - Benign prostatic hyperplasia without lower urinary tract 

symptoms


SNOMED:  592177691


(10) Tobacco abuse


ICD Codes:  Z72.0 - Tobacco use


SNOMED:  415446459


(11) PVD (peripheral vascular disease)


ICD Codes:  I73.9 - Peripheral vascular disease, unspecified


SNOMED:  745959358


(12) Alcoholic liver disease


Assessment & Plan:  Cirrhosis


ICD Codes:  K70.9 - Alcoholic liver disease, unspecified


SNOMED:  76836358


(13) Acute blood loss anemia


ICD Codes:  D62 - Acute posthemorrhagic anemia





Discharge Medications


Continued Medications:  


Acetaminophen* (Tylenol Extra Strength*) 500 Mg Tablet


500 MG ORAL Q6H PRN for Mild Pain/Temp > 100.5, TAB 0 Refills





Albuterol Sulfate (Ventolin Hfa) 18 Gm Hfa.aer.ad


1 PUFF INH EVERY 6 HOURS, #18 GM 0 Refills





Albuterol Sulfate* (Proair Hfa*) 8.5 Gm Hfa.aer.ad


1 PUFF INH Q6H, #8.5 GM 0 Refills





Aspirin* (Aspir 81*) 81 Mg Tablet.dr


81 MG ORAL DAILY, TAB





Finasteride* (Proscar*) 5 Mg Tablet


5 MG ORAL DAILY, #30 TAB 0 Refills





Folic Acid* (Folic Acid*) 1 Mg Tablet


1 MG ORAL DAILY, TAB





Lactulose (Lactulose) 10 Gm/15 Ml Solution


10 GM PO





Pantoprazole* (Pantoprazole*) 40 Mg Tablet.dr


40 MG ORAL DAILY, TAB





Tamsulosin Hcl (Tamsulosin Hcl*) 0.4 Mg Cap.er.24h


0.4 MG ORAL BEDTIME, CAP





Trazodone Hcl* (Desyrel*) 50 Mg Tablet


50 MG ORAL BEDTIME, TAB





 


Discontinued Medications:  


Furosemide* (Lasix*) 80 Mg Tablet


80 MG ORAL BID, TAB





Spironolactone* (Spironolactone*) 100 Mg Tablet


100 MG ORAL DAILY, TAB











Discharge


Condition Upon Discharge:  stable


Discharge Disposition


Patient was discharged to [facility] [place]


Discharge Diagnoses:  











Pankaj Solo M.D. Feb 26, 2018 17:17

## 2018-02-26 NOTE — 48 HOUR POST ANESTHESIA EVAL
Post Anesthesia Evaluation


Procedure:  r femur orif


Date of Evaluation:  Feb 26, 2018


Time of Evaluation:  07:11


Blood Pressure Systolic:  114


0:  54


Pulse Rate:  80


Temperature (Fahrenheit):  98.0


O2 Sat by Pulse Oximetry:  98


Airway:  patent


Nausea:  No


Vomiting:  No


Hydration Status:  adequate


Cardiopulmonary Status:


stable at this time


Mental Status/LOC:  patient returned to baseline


Follow-up Care/Observations:


na


Post-Anesthesia Complications:


none


Follow-up care needed:  N/A











DAPHNEY PHAN CRNA Feb 26, 2018 07:11

## 2018-02-26 NOTE — CARDIAC ELECTROPHYSIOLOGY PN
Assessment/Plan


Assessment/Plan


1. Status post fall versus syncope. Ruled out for myocardial infarction.   EF 65

%


2. History of asthma, on albuterol.


3. Right hip fracture. S/P ORIF by Dr Edmonds. No cardiac events 


4. Hypotension.No further hypotension. 


5. Anemia Hb dropped form 9 to 7.2. S/P prbc  


6. Short burst of atrial fib, no recurrence


7. Short AIVR, No recurrence





DW RN





Subjective


Subjective


Comfortable in NAD. Had PRBC again. Had short run of atrial fib and AIVR





Objective





Last 24 Hour Vital Signs








  Date Time  Temp Pulse Resp B/P (MAP) Pulse Ox O2 Delivery O2 Flow Rate FiO2


 


2/26/18 12:00 97.5 78 19 104/54 98 Room Air  





 97.5       


 


2/26/18 08:00 98.1 84 20 118/59 94 Room Air  





 98.1       


 


2/26/18 08:00  89      


 


2/26/18 07:59  78 18  95 Room Air  21


 


2/26/18 07:52  81 18  95 Room Air  21


 


2/26/18 07:52      Room Air  21


 


2/26/18 07:52     95 Room Air  


 


2/26/18 07:11 208.4 80   98   


 


2/26/18 04:00  88      


 


2/26/18 04:00 98.4 90 20 114/54 97 Nasal Cannula 3.0 





 98.4       


 


2/26/18 01:01      Nasal Cannula 3.0 32


 


2/26/18 01:01      Nasal Cannula 3.0 32


 


2/26/18 00:00 98.1 95 22 106/47 97 Nasal Cannula 3.0 





 98.1       


 


2/26/18 00:00  92      


 


2/25/18 21:00  87 20  95 Nasal Cannula 3.0 32


 


2/25/18 21:00  91 20  98 Nasal Cannula 3.0 32


 


2/25/18 20:00  91      


 


2/25/18 20:00 98.2 90 22 110/53 100 Nasal Cannula 3.0 





 98.2       


 


2/25/18 19:30      Nasal Cannula 3.0 32


 


2/25/18 19:30      Nasal Cannula 3.0 32


 


2/25/18 19:30      Nasal Cannula 3.0 32


 


2/25/18 19:30     97 Nasal Cannula 3.0 32


 


2/25/18 16:00 97.9 88 20 110/49 100 Nasal Cannula 3.0 





 97.9       


 


2/25/18 16:00  91      


 


2/25/18 13:35      Nasal Cannula  


 


2/25/18 13:35      Nasal Cannula  

















Intake and Output  


 


 2/25/18 2/26/18





 19:00 07:00


 


Intake Total 1470 ml 973 ml


 


Output Total 700 ml 600 ml


 


Balance 770 ml 373 ml


 


  


 


Intake Oral 800 ml 200 ml


 


IV Total 670 ml 773 ml


 


Output Urine Total 700 ml 600 ml











Laboratory Tests








Test


  2/26/18


10:07


 


White Blood Count


  8.2 K/UL


(4.8-10.8)


 


Red Blood Count


  2.61 M/UL


(4.70-6.10)  L


 


Hemoglobin


  9.0 G/DL


(14.2-18.0)  L


 


Hematocrit


  25.7 %


(42.0-52.0)  L


 


Mean Corpuscular Volume 98 FL (80-99)  


 


Mean Corpuscular Hemoglobin


  34.6 PG


(27.0-31.0)  H


 


Mean Corpuscular Hemoglobin


Concent 35.2 G/DL


(32.0-36.0)


 


Red Cell Distribution Width


  14.4 %


(11.6-14.8)


 


Platelet Count


  131 K/UL


(150-450)  L


 


Mean Platelet Volume


  5.5 FL


(6.5-10.1)  L


 


Neutrophils (%) (Auto)


  61.1 %


(45.0-75.0)


 


Lymphocytes (%) (Auto)


  16.8 %


(20.0-45.0)  L


 


Monocytes (%) (Auto)


  12.2 %


(1.0-10.0)  H


 


Eosinophils (%) (Auto)


  8.8 %


(0.0-3.0)  H


 


Basophils (%) (Auto)


  1.2 %


(0.0-2.0)


 


Sodium Level


  137 MMOL/L


(136-145)


 


Potassium Level


  3.7 MMOL/L


(3.5-5.1)


 


Chloride Level


  106 MMOL/L


()


 


Carbon Dioxide Level


  23 MMOL/L


(21-32)


 


Anion Gap


  8 mmol/L


(5-15)


 


Blood Urea Nitrogen


  15 mg/dL


(7-18)


 


Creatinine


  0.7 MG/DL


(0.55-1.30)


 


Estimat Glomerular Filtration


Rate  mL/min (>60)  


 


 


Glucose Level


  122 MG/DL


()  H


 


Calcium Level


  8.3 MG/DL


(8.5-10.1)  L








Objective


HEAD AND NECK: No JVD or carotid bruit.


LUNGS:  Clear.


CARDIOVASCULAR:  Regular S1 and S2 with no gallop or murmur.


ABDOMEN:  Soft.


EXTREMITIES: No pitting edema.  S/P Right hip surgery











NOHELIA OREILLY Feb 26, 2018 13:07

## 2018-02-26 NOTE — GENERAL PROGRESS NOTE
Assessment/Plan


Assessment/Plan


#. Anemia due to underlying chronic disease.  


--> Continue to closely monitor and trend. 


--> Anemia workup completed.  Results: Iron 80, TIBC 245, Folate 47.5, B12 441


--> Patient received prbc yesterday, hemoglobin levels today are >9. 


--> Hemoglobin goal is >7, trend daily. 


#. Thrombocytopenia.  Okay to continue with surgery. 


--> Okay to continue Lovenox daily for prophylaxis.  


---> Negative hepatitis panel and human immunodeficiency virus.


--> Improving. 


#. Coagulopathy, potentially secondary to liver disease.  


--> S/P ab ultrasound, was suboptimal and revealed no significant results 


#. Anemia due to orthopedic procedure, currently downtrended, potentially 

secondary to hemodilution.  


--> Again, continue to closely monitor.  


--> Does not need iron at this time. 


#. Syncope, probably due to anemia versus vertigo versus other causes.


#. Tobacco use.


#. Alcoholic liver disease.


#. History of cirrhosis.





Subjective


Date patient seen:  Feb 26, 2018


Constitutional:  Denies: no symptoms, chills, diaphoresis, fever, malaise, 

weakness, other


HEENT:  Denies: no symptoms, eye pain, blurred vision, tearing, double vision, 

ear pain, ear discharge, nose pain, nose congestion, throat pain, throat 

swelling, mouth pain, mouth swelling, other


Cardiovascular:  Denies: no symptoms, chest pain, edema, irregular heart rate, 

lightheadedness, palpitations, syncope, other


Respiratory:  Denies: no symptoms, cough, orthopnea, shortness of breath, SOB 

with excertion, SOB at rest, sputum, stridor, wheezing, other


Gastrointestinal/Abdominal:  Denies: no symptoms, abdomen distended, abdominal 

pain, black stools, tarry stools, blood in stool, constipated, diarrhea, 

difficulty swallowing, nausea, poor appetite, poor fluid intake, rectal bleeding

, vomiting, other


Genitourinary:  Denies: no symptoms, burning, discharge, frequency, flank pain, 

hematuria, incontinence, pain, urgency, other


Hematologic/Lymphatic:  Reports: anemia


Allergies:  


Coded Allergies:  


     No Known Allergies (Unverified , 2/21/18)


Subjective


No major events overnight. Afebrile. Pending dc.





Objective





Last 24 Hour Vital Signs








  Date Time  Temp Pulse Resp B/P (MAP) Pulse Ox O2 Delivery O2 Flow Rate FiO2


 


2/26/18 19:29 98.2       


 


2/26/18 16:00  83      


 


2/26/18 16:00 98.2 88 20 101/55 97 Room Air  





 98.2       


 


2/26/18 13:23  84 18  95 Room Air  21


 


2/26/18 13:15  79 18  96 Room Air  21


 


2/26/18 12:00 97.5 78 19 104/54 98 Room Air  





 97.5       


 


2/26/18 12:00  77      


 


2/26/18 08:00 98.1 84 20 118/59 94 Room Air  





 98.1       


 


2/26/18 08:00  89      


 


2/26/18 07:59  78 18  95 Room Air  21


 


2/26/18 07:52  81 18  95 Room Air  21


 


2/26/18 07:52      Room Air  21


 


2/26/18 07:52     95 Room Air  


 


2/26/18 07:11 208.4 80   98   


 


2/26/18 04:00  88      


 


2/26/18 04:00 98.4 90 20 114/54 97 Nasal Cannula 3.0 





 98.4       


 


2/26/18 01:01      Nasal Cannula 3.0 32


 


2/26/18 01:01      Nasal Cannula 3.0 32


 


2/26/18 00:00 98.1 95 22 106/47 97 Nasal Cannula 3.0 





 98.1       


 


2/26/18 00:00  92      

















Intake and Output  


 


 2/25/18 2/26/18





 19:00 07:00


 


Intake Total 1470 ml 973 ml


 


Output Total 700 ml 600 ml


 


Balance 770 ml 373 ml


 


  


 


Intake Oral 800 ml 200 ml


 


IV Total 670 ml 773 ml


 


Output Urine Total 700 ml 600 ml








Laboratory Tests


2/26/18 10:07: 


White Blood Count 8.2, Red Blood Count 2.61L, Hemoglobin 9.0L, Hematocrit 25.7L

, Mean Corpuscular Volume 98, Mean Corpuscular Hemoglobin 34.6H, Mean 

Corpuscular Hemoglobin Concent 35.2, Red Cell Distribution Width 14.4, Platelet 

Count 131L, Mean Platelet Volume 5.5L, Neutrophils (%) (Auto) 61.1, Lymphocytes 

(%) (Auto) 16.8L, Monocytes (%) (Auto) 12.2H, Eosinophils (%) (Auto) 8.8H, 

Basophils (%) (Auto) 1.2, Sodium Level 137, Potassium Level 3.7, Chloride Level 

106, Carbon Dioxide Level 23, Anion Gap 8, Blood Urea Nitrogen 15, Creatinine 

0.7, Estimat Glomerular Filtration Rate , Glucose Level 122H, Calcium Level 8.3L


Height (Feet):  5


Height (Inches):  6.00


Weight (Pounds):  135


General Appearance:  no apparent distress


EENT:  normal ENT inspection


Neck:  supple


Respiratory/Chest:  lungs clear


Abdomen:  non tender











Gautam Beaulieu Feb 26, 2018 23:31

## 2018-02-28 NOTE — DIAGNOSTIC IMAGING REPORT
--------------- APPROVED REPORT --------------





CPT Code: 37234



Vascular Symptoms

Syncope



Doppler Spectral Velocity Analysis

RightLeft







arteries. The Doppler spectral flow analysis indicates the degree of stenosis is minimal 

(20-30%) in the common carotid artery, mild to moderate (40-50%) in the internal carotid 

artery, and  moderate (60-70%) in the external carotid artery.  VERTEBRAL - The vertebral 

artery is patent, without evidence of stenosis or steal.



arteries. The Doppler spectral flow analysis indicates the degree of stenosis is moderate 

(60-70%) in the common carotid artery, moderate (50-60%) in the internal carotid artery, 

and  moderate (40-50%) in the external carotid artery.  VERTEBRAL - The vertebral artery 

is patent, without evidence of stenosis or steal.

## 2019-03-23 ENCOUNTER — HOSPITAL ENCOUNTER (INPATIENT)
Dept: HOSPITAL 72 - EMR | Age: 74
LOS: 4 days | Discharge: NURSING, REHAB OR LONG-TERM CARE | DRG: 536 | End: 2019-03-27
Payer: MEDICAID

## 2019-03-23 VITALS — WEIGHT: 137.44 LBS | HEIGHT: 69 IN | BODY MASS INDEX: 20.36 KG/M2

## 2019-03-23 VITALS — DIASTOLIC BLOOD PRESSURE: 58 MMHG | SYSTOLIC BLOOD PRESSURE: 115 MMHG

## 2019-03-23 VITALS — DIASTOLIC BLOOD PRESSURE: 65 MMHG | SYSTOLIC BLOOD PRESSURE: 141 MMHG

## 2019-03-23 VITALS — DIASTOLIC BLOOD PRESSURE: 59 MMHG | SYSTOLIC BLOOD PRESSURE: 138 MMHG

## 2019-03-23 DIAGNOSIS — S32.512A: Primary | ICD-10-CM

## 2019-03-23 DIAGNOSIS — I25.2: ICD-10-CM

## 2019-03-23 DIAGNOSIS — Z87.891: ICD-10-CM

## 2019-03-23 DIAGNOSIS — D69.6: ICD-10-CM
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
## 2019-03-23 DIAGNOSIS — M25.551: ICD-10-CM

## 2019-03-23 DIAGNOSIS — Z88.8: ICD-10-CM

## 2019-03-23 DIAGNOSIS — Z90.01: ICD-10-CM

## 2019-03-23 DIAGNOSIS — D72.819: ICD-10-CM

## 2019-03-23 DIAGNOSIS — J44.9: ICD-10-CM

## 2019-03-23 DIAGNOSIS — N40.0: ICD-10-CM

## 2019-03-23 LAB
ADD MANUAL DIFF: YES
ALBUMIN SERPL-MCNC: 3.3 G/DL (ref 3.4–5)
ALBUMIN/GLOB SERPL: 0.8 {RATIO} (ref 1–2.7)
ALP SERPL-CCNC: 101 U/L (ref 46–116)
ALT SERPL-CCNC: 40 U/L (ref 12–78)
ANION GAP SERPL CALC-SCNC: 11 MMOL/L (ref 5–15)
APTT BLD: 29 SEC (ref 23–33)
AST SERPL-CCNC: 47 U/L (ref 15–37)
BILIRUB SERPL-MCNC: 0.6 MG/DL (ref 0.2–1)
BUN SERPL-MCNC: 18 MG/DL (ref 7–18)
CALCIUM SERPL-MCNC: 8.8 MG/DL (ref 8.5–10.1)
CHLORIDE SERPL-SCNC: 101 MMOL/L (ref 98–107)
CO2 SERPL-SCNC: 22 MMOL/L (ref 21–32)
CREAT SERPL-MCNC: 0.9 MG/DL (ref 0.55–1.3)
ERYTHROCYTE [DISTWIDTH] IN BLOOD BY AUTOMATED COUNT: 14 % (ref 11.6–14.8)
GLOBULIN SER-MCNC: 4.4 G/DL
HCT VFR BLD CALC: 39.5 % (ref 42–52)
HGB BLD-MCNC: 13.5 G/DL (ref 14.2–18)
INR PPP: 1.2 (ref 0.9–1.1)
MCV RBC AUTO: 99 FL (ref 80–99)
PLATELET # BLD: 72 K/UL (ref 150–450)
POTASSIUM SERPL-SCNC: 4.2 MMOL/L (ref 3.5–5.1)
RBC # BLD AUTO: 4 M/UL (ref 4.7–6.1)
SODIUM SERPL-SCNC: 134 MMOL/L (ref 136–145)
WBC # BLD AUTO: 3.7 K/UL (ref 4.8–10.8)

## 2019-03-23 PROCEDURE — 80048 BASIC METABOLIC PNL TOTAL CA: CPT

## 2019-03-23 PROCEDURE — 73502 X-RAY EXAM HIP UNI 2-3 VIEWS: CPT

## 2019-03-23 PROCEDURE — 85730 THROMBOPLASTIN TIME PARTIAL: CPT

## 2019-03-23 PROCEDURE — 87081 CULTURE SCREEN ONLY: CPT

## 2019-03-23 PROCEDURE — 94640 AIRWAY INHALATION TREATMENT: CPT

## 2019-03-23 PROCEDURE — 86900 BLOOD TYPING SEROLOGIC ABO: CPT

## 2019-03-23 PROCEDURE — 80053 COMPREHEN METABOLIC PANEL: CPT

## 2019-03-23 PROCEDURE — 85007 BL SMEAR W/DIFF WBC COUNT: CPT

## 2019-03-23 PROCEDURE — 86850 RBC ANTIBODY SCREEN: CPT

## 2019-03-23 PROCEDURE — 94760 N-INVAS EAR/PLS OXIMETRY 1: CPT

## 2019-03-23 PROCEDURE — 99285 EMERGENCY DEPT VISIT HI MDM: CPT

## 2019-03-23 PROCEDURE — 36415 COLL VENOUS BLD VENIPUNCTURE: CPT

## 2019-03-23 PROCEDURE — 85025 COMPLETE CBC W/AUTO DIFF WBC: CPT

## 2019-03-23 PROCEDURE — 96374 THER/PROPH/DIAG INJ IV PUSH: CPT

## 2019-03-23 PROCEDURE — 94664 DEMO&/EVAL PT USE INHALER: CPT

## 2019-03-23 PROCEDURE — 86901 BLOOD TYPING SEROLOGIC RH(D): CPT

## 2019-03-23 PROCEDURE — 85610 PROTHROMBIN TIME: CPT

## 2019-03-23 NOTE — DIAGNOSTIC IMAGING REPORT
EXAM:

  XR Right Hip With Pelvis When Performed, 1 View

 

CLINICAL HISTORY:

  PAIN

 

TECHNIQUE:

  Frontal view of the right hip, with pelvis when performed.

 

COMPARISON:

  No relevant prior studies available.

 

FINDINGS:

  Bones/joints:  Right proximal femoral intramedullary temitope with 

traversing head and neck interference screw.  Sub-trochanteric fracture 

is probably chronic, some component of acute injury/reinjury cannot be 

definitively excluded without prior imaging comparison.  Likely left 

superior pubic ramus comminuted fracture with extension likely into the 

left pubic symphysis.  Possible left inferior pubic ramus fracture 

incompletely visualized.  Osteopenia.  No dislocation.

  Soft tissues:  Unremarkable.

  Other findings:  ASVD.

 

IMPRESSION:     

1.  Right proximal femoral intramedullary temitope with traversing head and 

neck interference screw.

2.  Sub-trochanteric fracture is probably chronic, some component of 

acute injury/reinjury cannot be definitively excluded without prior 

imaging comparison.

3.  If there is continued concern for right periprosthetic fracture, 

consider cross-sectional imaging with metal artifact reduction technique 

and/or follow-up radiographs in 7-10 days.

4.  Strongly recommend correlation with prior imaging.

5.  Likely left superior pubic ramus comminuted fracture with extension 

likely into the left pubic symphysis.

6.  Possible left inferior pubic ramus fracture incompletely visualized.

## 2019-03-23 NOTE — DIAGNOSTIC IMAGING REPORT
EXAM:

  XR Left Hip With Pelvis When Performed, 1 View

 

CLINICAL HISTORY:

  PAIN

 

TECHNIQUE:

  Frontal view of the left hip, with pelvis when performed.

 

COMPARISON:

  None.

 

FINDINGS:

  Bones/joints:  Probable left comminuted minimally displaced superior 

pubic ramus fracture which extends into the symphysis pubis.  No left 

inferior pubic ramus fracture seen.  Osteopenia.  No dislocation.

  Soft tissues:  Unremarkable.

  Vasculature:  Partially seen superficial femoral artery stent.  ASVD.  

Partially seen left iliac artery stent.

 

IMPRESSION:     

1.  Probable left comminuted minimally displaced superior pubic ramus 

fracture which extends into the symphysis pubis.

2.  No left inferior pubic ramus fracture seen.

3.  Consider cross sectional imaging for more sensitive evaluation.

## 2019-03-24 VITALS — SYSTOLIC BLOOD PRESSURE: 124 MMHG | DIASTOLIC BLOOD PRESSURE: 85 MMHG

## 2019-03-24 VITALS — SYSTOLIC BLOOD PRESSURE: 159 MMHG | DIASTOLIC BLOOD PRESSURE: 83 MMHG

## 2019-03-24 VITALS — SYSTOLIC BLOOD PRESSURE: 101 MMHG | DIASTOLIC BLOOD PRESSURE: 70 MMHG

## 2019-03-24 VITALS — DIASTOLIC BLOOD PRESSURE: 69 MMHG | SYSTOLIC BLOOD PRESSURE: 154 MMHG

## 2019-03-24 VITALS — DIASTOLIC BLOOD PRESSURE: 89 MMHG | SYSTOLIC BLOOD PRESSURE: 123 MMHG

## 2019-03-24 VITALS — SYSTOLIC BLOOD PRESSURE: 107 MMHG | DIASTOLIC BLOOD PRESSURE: 51 MMHG

## 2019-03-24 LAB
ADD MANUAL DIFF: YES
ANION GAP SERPL CALC-SCNC: 8 MMOL/L (ref 5–15)
BUN SERPL-MCNC: 18 MG/DL (ref 7–18)
CALCIUM SERPL-MCNC: 8.7 MG/DL (ref 8.5–10.1)
CHLORIDE SERPL-SCNC: 102 MMOL/L (ref 98–107)
CO2 SERPL-SCNC: 26 MMOL/L (ref 21–32)
CREAT SERPL-MCNC: 0.9 MG/DL (ref 0.55–1.3)
ERYTHROCYTE [DISTWIDTH] IN BLOOD BY AUTOMATED COUNT: 14.4 % (ref 11.6–14.8)
HCT VFR BLD CALC: 39.3 % (ref 42–52)
HGB BLD-MCNC: 13.1 G/DL (ref 14.2–18)
MCV RBC AUTO: 99 FL (ref 80–99)
PLATELET # BLD: 62 K/UL (ref 150–450)
POTASSIUM SERPL-SCNC: 4 MMOL/L (ref 3.5–5.1)
RBC # BLD AUTO: 3.95 M/UL (ref 4.7–6.1)
SODIUM SERPL-SCNC: 136 MMOL/L (ref 136–145)
WBC # BLD AUTO: 4.1 K/UL (ref 4.8–10.8)

## 2019-03-24 RX ADMIN — MORPHINE SULFATE PRN MG: 2 INJECTION, SOLUTION INTRAMUSCULAR; INTRAVENOUS at 12:57

## 2019-03-24 RX ADMIN — ALBUTEROL SCH MG: 2 TABLET ORAL at 21:00

## 2019-03-24 RX ADMIN — FLUTICASONE PROPIONATE AND SALMETEROL SCH PUFFS: 50; 100 POWDER RESPIRATORY (INHALATION) at 11:29

## 2019-03-24 RX ADMIN — FLUTICASONE PROPIONATE AND SALMETEROL SCH PUFFS: 50; 100 POWDER RESPIRATORY (INHALATION) at 22:04

## 2019-03-24 RX ADMIN — ALBUTEROL SCH MG: 2 TABLET ORAL at 13:23

## 2019-03-24 RX ADMIN — ALBUTEROL SCH MG: 2 TABLET ORAL at 17:03

## 2019-03-24 RX ADMIN — TAMSULOSIN HYDROCHLORIDE SCH MG: 0.4 CAPSULE ORAL at 21:07

## 2019-03-24 RX ADMIN — ALBUTEROL SCH MG: 2 TABLET ORAL at 08:40

## 2019-03-24 RX ADMIN — MORPHINE SULFATE PRN MG: 2 INJECTION, SOLUTION INTRAMUSCULAR; INTRAVENOUS at 17:03

## 2019-03-24 RX ADMIN — TRAZODONE HYDROCHLORIDE SCH MG: 50 TABLET ORAL at 21:07

## 2019-03-24 RX ADMIN — MORPHINE SULFATE PRN MG: 2 INJECTION, SOLUTION INTRAMUSCULAR; INTRAVENOUS at 21:07

## 2019-03-24 NOTE — HISTORY AND PHYSICAL REPORT
DATE OF ADMISSION:  03/23/2019

REASON FOR ADMISSION:

1. Status post fall.

2. Hip pain.



HISTORY OF PRESENT ILLNESS:  The patient is a pleasant 73-year-old

gentleman brought in by EMS overnight post mechanical fall.  He tripped

while using his walker.  He has had surgery on his right hip last month

and had a temitope in place.  This was done here in February at [facility].  The patient was complaining of right hip pain, sharp, 10/10.

When imaging was conducted, the patient was noted to have a left

comminuted minimally displaced superior pubic ramus fracture, which

extended into the symphysis pubis.  The patient is resting comfortably

currently.  No nausea, vomiting, or diarrhea.  No chest pain.



ALLERGIES:  Tetracycline.



PAST MEDICAL HISTORY:

1. COPD.

2. Multiple falls.



SOCIAL HISTORY:  No tobacco, alcohol, or illicit drug use.



FAMILY HISTORY:  Noncontributory.



PAST SURGICAL HISTORY:  Right hip surgery.



LABORATORY DATA:  Labs dated 03/23/2019, sodium 134, potassium 4.2,

creatinine 0.9, glucose 111.  White cell count 3.7, hemoglobin 13.5, and

platelet count 72.



PHYSICAL EXAMINATION:

VITAL SIGNS:  Blood pressure 123/89, respiratory rate 18, pulse 76, and

temperature 97.2.  94% oxygen saturation on room air.

GENERAL:  The patient is awake and alert, not otherwise in

distress.

HEENT:  Extraocular muscles intact.  No lymphadenopathy noted.

CARDIOVASCULAR:  S1, S2.  No rubs or gallops.  Regular rate.

PULMONARY:  Clear to auscultation bilaterally.  No rales, rhonchi, or

wheezes.

ABDOMEN:  Nondistended and nontender.

EXTREMITIES:  No edema noted.



ASSESSMENT AND PLAN:

1. Chronic obstructive pulmonary disease.  At this time, continue his

inhaler therapies.  The patient is stable from this standpoint.

2. Benign prostatic hypertrophy.  Continue Flomax and Proscar.

3. Status post mechanical fall with displaced superior pubic ramus

fracture.  We will await orthopedic evaluation and suggestions.  We will

also have PT evaluate the patient for recommendations.



Anticipate discharge either Monday or Tuesday pending orthopedic and PT

evaluations.









  ______________________________________________

  Ever Davenport MD DR:  ARI

D:  03/24/2019 09:16

T:  03/24/2019 16:14

JOB#:  [id number]

CC:

## 2019-03-25 VITALS — SYSTOLIC BLOOD PRESSURE: 95 MMHG | DIASTOLIC BLOOD PRESSURE: 65 MMHG

## 2019-03-25 VITALS — DIASTOLIC BLOOD PRESSURE: 62 MMHG | SYSTOLIC BLOOD PRESSURE: 133 MMHG

## 2019-03-25 VITALS — SYSTOLIC BLOOD PRESSURE: 117 MMHG | DIASTOLIC BLOOD PRESSURE: 56 MMHG

## 2019-03-25 VITALS — DIASTOLIC BLOOD PRESSURE: 83 MMHG | SYSTOLIC BLOOD PRESSURE: 144 MMHG

## 2019-03-25 VITALS — DIASTOLIC BLOOD PRESSURE: 77 MMHG | SYSTOLIC BLOOD PRESSURE: 118 MMHG

## 2019-03-25 VITALS — SYSTOLIC BLOOD PRESSURE: 130 MMHG | DIASTOLIC BLOOD PRESSURE: 70 MMHG

## 2019-03-25 RX ADMIN — TRAZODONE HYDROCHLORIDE SCH MG: 50 TABLET ORAL at 21:50

## 2019-03-25 RX ADMIN — HYDROCODONE BITARTRATE AND ACETAMINOPHEN PRN TAB: 5; 325 TABLET ORAL at 09:16

## 2019-03-25 RX ADMIN — ALBUTEROL SCH MG: 2 TABLET ORAL at 21:50

## 2019-03-25 RX ADMIN — ALBUTEROL SCH MG: 2 TABLET ORAL at 17:59

## 2019-03-25 RX ADMIN — FLUTICASONE PROPIONATE AND SALMETEROL SCH PUFFS: 50; 100 POWDER RESPIRATORY (INHALATION) at 22:14

## 2019-03-25 RX ADMIN — HYDROCODONE BITARTRATE AND ACETAMINOPHEN PRN TAB: 5; 325 TABLET ORAL at 21:56

## 2019-03-25 RX ADMIN — ALBUTEROL SCH MG: 2 TABLET ORAL at 13:00

## 2019-03-25 RX ADMIN — ALBUTEROL SCH MG: 2 TABLET ORAL at 08:13

## 2019-03-25 RX ADMIN — FLUTICASONE PROPIONATE AND SALMETEROL SCH PUFFS: 50; 100 POWDER RESPIRATORY (INHALATION) at 08:02

## 2019-03-25 RX ADMIN — MORPHINE SULFATE PRN MG: 2 INJECTION, SOLUTION INTRAMUSCULAR; INTRAVENOUS at 04:20

## 2019-03-25 RX ADMIN — TAMSULOSIN HYDROCHLORIDE SCH MG: 0.4 CAPSULE ORAL at 21:50

## 2019-03-25 NOTE — NEPHROLOGY PROGRESS NOTE
Assessment/Plan


Assessment/Plan


A/P





1) Pubis Ramus Fx- await Ortho and PT eval 


    - start DC planning


2) Thrombocytopenia- consult Heme


3) DVT prophylaxsis- SCDs





Subjective


Date patient seen:  Mar 25, 2019


Time patient seen:  08:23


ROS Limited/Unobtainable:  No


Allergies:  


Coded Allergies:  


     TETRACYCLINE (Unverified  Allergy, Unknown, 3/23/19)


Subjective


Patient with mild hip pain





Objective





Last 24 Hour Vital Signs








  Date Time  Temp Pulse Resp B/P (MAP) Pulse Ox O2 Delivery O2 Flow Rate FiO2


 


3/25/19 08:07  69 20  92 Nasal Cannula 2.0 28


 


3/25/19 08:02  69 20  92 Nasal Cannula 2.0 28


 


3/25/19 08:01      Nasal Cannula 2.0 28


 


3/25/19 08:00     92 Nasal Cannula 2.0 28


 


3/25/19 04:00 98.3 66 19 144/83 (103) 99   


 


3/25/19 00:00 97.9 65 17 118/77 (91) 97   


 


3/24/19 22:05  62 18  95 Nasal Cannula 2.0 28


 


3/24/19 22:04  61 18  95 Nasal Cannula 2.0 28


 


3/24/19 21:00      Room Air  


 


3/24/19 20:00 98.0 64 18 101/70 (80) 95   


 


3/24/19 19:35      Nasal Cannula 2.0 28


 


3/24/19 19:35  95 18   Nasal Cannula 2.0 28


 


3/24/19 19:35     95 Nasal Cannula 2.0 28


 


3/24/19 15:54 98.6 77 17 107/51 (69) 97   


 


3/24/19 12:00 97.9 52 19 124/85 (98) 98   


 


3/24/19 11:34     97 Nasal Cannula 2.0 28


 


3/24/19 11:34      Nasal Cannula 2.0 28


 


3/24/19 11:34  87 18   Nasal Cannula 2.0 28


 


3/24/19 09:00      Room Air  

















Intake and Output  


 


 3/24/19 3/25/19





 19:00 07:00


 


Intake Total 800 ml 900 ml


 


Output Total 900 ml 


 


Balance -100 ml 900 ml


 


  


 


Intake Oral 300 ml 400 ml


 


IV Total 500 ml 500 ml


 


Output Urine Total 900 ml 


 


# Voids  3








Laboratory Tests


3/24/19 10:30: 


White Blood Count 4.1L, Red Blood Count 3.95L, Hemoglobin 13.1L, Hematocrit 

39.3L, Mean Corpuscular Volume 99, Mean Corpuscular Hemoglobin 33.0H, Mean 

Corpuscular Hemoglobin Concent 33.2, Red Cell Distribution Width 14.4, Platelet 

Count 62L, Mean Platelet Volume 5.9L, Neutrophils (%) (Auto) , Lymphocytes (%) (

Auto) , Monocytes (%) (Auto) , Eosinophils (%) (Auto) , Basophils (%) (Auto) , 

Differential Total Cells Counted 100, Neutrophils % (Manual) 56, Lymphocytes % (

Manual) 29, Monocytes % (Manual) 11H, Eosinophils % (Manual) 3, Basophils % (

Manual) 1, Band Neutrophils 0, Platelet Estimate DecreasedL, Platelet 

Morphology Normal, Anisocytosis 1+, Sodium Level 136, Potassium Level 4.0, 

Chloride Level 102, Carbon Dioxide Level 26, Anion Gap 8, Blood Urea Nitrogen 18

, Creatinine 0.9, Estimat Glomerular Filtration Rate , Glucose Level 95, 

Calcium Level 8.7


Height (Feet):  5


Height (Inches):  9.00


Weight (Pounds):  138


General Appearance:  no apparent distress, alert


EENT:  normal ENT inspection


Neck:  normal alignment, supple


Cardiovascular:  normal rate, regular rhythm


Respiratory/Chest:  lungs clear, normal breath sounds


Abdomen:  non tender, soft


Edema:  no edema noted Arm (L), no edema noted Arm (R), no edema noted Leg (L), 

no edema noted Leg (R), no edema noted Pedal (L), no edema noted Pedal (R), no 

edema noted Generalized











Ever Davenport MD Mar 25, 2019 08:27

## 2019-03-26 VITALS — DIASTOLIC BLOOD PRESSURE: 55 MMHG | SYSTOLIC BLOOD PRESSURE: 128 MMHG

## 2019-03-26 VITALS — DIASTOLIC BLOOD PRESSURE: 73 MMHG | SYSTOLIC BLOOD PRESSURE: 147 MMHG

## 2019-03-26 VITALS — SYSTOLIC BLOOD PRESSURE: 121 MMHG | DIASTOLIC BLOOD PRESSURE: 59 MMHG

## 2019-03-26 VITALS — DIASTOLIC BLOOD PRESSURE: 58 MMHG | SYSTOLIC BLOOD PRESSURE: 113 MMHG

## 2019-03-26 VITALS — DIASTOLIC BLOOD PRESSURE: 62 MMHG | SYSTOLIC BLOOD PRESSURE: 114 MMHG

## 2019-03-26 VITALS — DIASTOLIC BLOOD PRESSURE: 45 MMHG | SYSTOLIC BLOOD PRESSURE: 114 MMHG

## 2019-03-26 LAB
ADD MANUAL DIFF: YES
ERYTHROCYTE [DISTWIDTH] IN BLOOD BY AUTOMATED COUNT: 14.4 % (ref 11.6–14.8)
HCT VFR BLD CALC: 31.9 % (ref 42–52)
HGB BLD-MCNC: 10.7 G/DL (ref 14.2–18)
MCV RBC AUTO: 99 FL (ref 80–99)
PLATELET # BLD: 63 K/UL (ref 150–450)
RBC # BLD AUTO: 3.22 M/UL (ref 4.7–6.1)
WBC # BLD AUTO: 4 K/UL (ref 4.8–10.8)

## 2019-03-26 RX ADMIN — HYDROCODONE BITARTRATE AND ACETAMINOPHEN PRN TAB: 5; 325 TABLET ORAL at 08:29

## 2019-03-26 RX ADMIN — TAMSULOSIN HYDROCHLORIDE SCH MG: 0.4 CAPSULE ORAL at 21:02

## 2019-03-26 RX ADMIN — ALBUTEROL SCH MG: 2 TABLET ORAL at 21:02

## 2019-03-26 RX ADMIN — HYDROCODONE BITARTRATE AND ACETAMINOPHEN PRN TAB: 5; 325 TABLET ORAL at 17:50

## 2019-03-26 RX ADMIN — ALBUTEROL SCH MG: 2 TABLET ORAL at 12:22

## 2019-03-26 RX ADMIN — ALBUTEROL SCH MG: 2 TABLET ORAL at 08:28

## 2019-03-26 RX ADMIN — HYDROCODONE BITARTRATE AND ACETAMINOPHEN PRN TAB: 5; 325 TABLET ORAL at 22:41

## 2019-03-26 RX ADMIN — FLUTICASONE PROPIONATE AND SALMETEROL SCH PUFFS: 50; 100 POWDER RESPIRATORY (INHALATION) at 20:56

## 2019-03-26 RX ADMIN — TRAZODONE HYDROCHLORIDE SCH MG: 50 TABLET ORAL at 21:02

## 2019-03-26 RX ADMIN — ALBUTEROL SCH MG: 2 TABLET ORAL at 17:50

## 2019-03-26 RX ADMIN — FLUTICASONE PROPIONATE AND SALMETEROL SCH PUFFS: 50; 100 POWDER RESPIRATORY (INHALATION) at 09:30

## 2019-03-26 NOTE — DISCHARGE INSTRUCTIONS
Discharge Instructions


Discharge Instructions


Services at Discharge:  day care


Resume Normal Activity?:  No


Activity:  as tolerated


Follow Up Orders


Follow Up With Heme/Onc





For Congestive Heart Failure


Reminder


Report to your physician any weight gain of 5 pounds or more in one week.











Ever Davenport MD Mar 26, 2019 09:14

## 2019-03-26 NOTE — NEPHROLOGY PROGRESS NOTE
Assessment/Plan


Assessment/Plan


A/P





1) Pubis Ramus Fx- await Ortho and PT eval 


    - DC to SNF with Rehab


2) Thrombocytopenia- consulted Heme


3) DVT prophylaxsis- SCDs





DC today once placement arranged and cleared by Heme Onc





Subjective


Date patient seen:  Mar 26, 2019


Time patient seen:  09:15


ROS Limited/Unobtainable:  No


Allergies:  


Coded Allergies:  


     TETRACYCLINE (Unverified  Allergy, Unknown, 3/23/19)


Subjective


Patient with mild hip pain but improving





Objective





Last 24 Hour Vital Signs








  Date Time  Temp Pulse Resp B/P (MAP) Pulse Ox O2 Delivery O2 Flow Rate FiO2


 


3/26/19 04:00 98.0 88 20 114/62 (79) 97   


 


3/26/19 00:43      Room Air  


 


3/26/19 00:00 99.1 87 20 121/59 (79) 96   


 


3/25/19 22:26 98.1       


 


3/25/19 22:15  72 18  94 Nasal Cannula 2.0 28


 


3/25/19 22:14  72 20  94 Nasal Cannula 2.0 28


 


3/25/19 20:00 99.0 84 20 117/56 (76) 95   





  84      


 


3/25/19 19:30     93 Nasal Cannula 2.0 28


 


3/25/19 19:30      Nasal Cannula 2.0 28


 


3/25/19 16:00 98.1 78 20 133/62 (85) 96   


 


3/25/19 12:00 97.3 75 19 130/70 (90) 95   

















Intake and Output  


 


 3/25/19 3/26/19





 19:00 07:00


 


Intake Total 1800 ml 840 ml


 


Output Total  1250 ml


 


Balance 1800 ml -410 ml


 


  


 


Intake Oral 1800 ml 840 ml


 


Output Urine Total  1250 ml


 


# Voids  5








Laboratory Tests


3/26/19 06:00: 


White Blood Count 4.0L, Red Blood Count 3.22L, Hemoglobin 10.7L, Hematocrit 

31.9L, Mean Corpuscular Volume 99, Mean Corpuscular Hemoglobin 33.3H, Mean 

Corpuscular Hemoglobin Concent 33.6, Red Cell Distribution Width 14.4, Platelet 

Count 63L, Mean Platelet Volume 6.4L, Neutrophils (%) (Auto) , Lymphocytes (%) (

Auto) , Monocytes (%) (Auto) , Eosinophils (%) (Auto) , Basophils (%) (Auto) , 

Differential Total Cells Counted 100, Neutrophils % (Manual) 48, Lymphocytes % (

Manual) 34, Monocytes % (Manual) 13H, Eosinophils % (Manual) 5H, Basophils % (

Manual) 0, Band Neutrophils 0, Platelet Estimate DecreasedL, Platelet 

Morphology Normal, Hypochromasia 1+


Height (Feet):  5


Height (Inches):  9.00


Weight (Pounds):  138


General Appearance:  no apparent distress, alert


EENT:  normal ENT inspection


Neck:  non-tender, normal alignment


Cardiovascular:  normal rate, regular rhythm


Respiratory/Chest:  lungs clear, normal breath sounds


Abdomen:  non tender, soft


Edema:  no edema noted Arm (L), no edema noted Arm (R), no edema noted Leg (L), 

no edema noted Leg (R), no edema noted Pedal (L), no edema noted Pedal (R), no 

edema noted Generalized











Ever Davenport MD Mar 26, 2019 09:16

## 2019-03-27 VITALS — DIASTOLIC BLOOD PRESSURE: 78 MMHG | SYSTOLIC BLOOD PRESSURE: 144 MMHG

## 2019-03-27 VITALS — DIASTOLIC BLOOD PRESSURE: 50 MMHG | SYSTOLIC BLOOD PRESSURE: 117 MMHG

## 2019-03-27 VITALS — DIASTOLIC BLOOD PRESSURE: 63 MMHG | SYSTOLIC BLOOD PRESSURE: 117 MMHG

## 2019-03-27 VITALS — DIASTOLIC BLOOD PRESSURE: 89 MMHG | SYSTOLIC BLOOD PRESSURE: 132 MMHG

## 2019-03-27 RX ADMIN — FLUTICASONE PROPIONATE AND SALMETEROL SCH PUFFS: 50; 100 POWDER RESPIRATORY (INHALATION) at 10:20

## 2019-03-27 RX ADMIN — ALBUTEROL SCH MG: 2 TABLET ORAL at 09:34

## 2019-03-27 RX ADMIN — ALBUTEROL SCH MG: 2 TABLET ORAL at 12:51

## 2019-03-27 NOTE — NEPHROLOGY PROGRESS NOTE
Assessment/Plan


Assessment/Plan


A/P





1) Pubis Ramus Fx-Ortho and PT eval 


    - DC to SNF today


2) Thrombocytopenia- consulted Heme


3) DVT prophylaxsis- SCDs





DC today. Clearance from Heme





Subjective


Date patient seen:  Mar 27, 2019


Time patient seen:  08:13


ROS Limited/Unobtainable:  No


Allergies:  


Coded Allergies:  


     TETRACYCLINE (Unverified  Allergy, Unknown, 3/23/19)


Subjective


Patient awaiting DC to [facility]





Objective





Last 24 Hour Vital Signs








  Date Time  Temp Pulse Resp B/P (MAP) Pulse Ox O2 Delivery O2 Flow Rate FiO2


 


3/27/19 07:12     96 Nasal Cannula 2.0 28


 


3/27/19 07:12      Nasal Cannula 2.0 28


 


3/27/19 04:00 98.0 73 18 132/89 (103) 19   


 


3/27/19 00:00 98.5 92 18 144/78 (100) 96   


 


3/26/19 23:11 97.7       


 


3/26/19 21:00      Room Air  


 


3/26/19 20:59  89 20  94 Nasal Cannula 2.0 28


 


3/26/19 20:59     94 Nasal Cannula 2.0 28


 


3/26/19 20:59      Nasal Cannula 2.0 28


 


3/26/19 20:56  89 20  94 Nasal Cannula 2.0 28


 


3/26/19 20:00 98.4 83 17 147/73 (97) 96   


 


3/26/19 16:00 98.5  18 128/55 (79) 98   


 


3/26/19 12:00 97.7 82 20 114/45 (68) 96   


 


3/26/19 09:34  90 20  92 Nasal Cannula 2.0 28


 


3/26/19 09:31     92 Nasal Cannula 2.0 28


 


3/26/19 09:31  95 20  93 Nasal Cannula 2.0 28


 


3/26/19 09:31      Nasal Cannula 2.0 28


 


3/26/19 09:00      Room Air  

















Intake and Output  


 


 3/26/19 3/27/19





 18:59 06:59


 


Intake Total 980 ml 2040 ml


 


Output Total 300 ml 1750 ml


 


Balance 680 ml 290 ml


 


  


 


Intake Oral 980 ml 2040 ml


 


Output Urine Total 300 ml 1750 ml


 


# Voids 4 8








Height (Feet):  5


Height (Inches):  9.00


Weight (Pounds):  137


General Appearance:  no apparent distress


EENT:  normal ENT inspection


Neck:  normal alignment, supple


Cardiovascular:  normal rate, regular rhythm


Respiratory/Chest:  lungs clear, normal breath sounds


Abdomen:  non tender, soft


Edema:  no edema noted Arm (L), no edema noted Arm (R), no edema noted Leg (L), 

no edema noted Leg (R), no edema noted Pedal (L), no edema noted Pedal (R), no 

edema noted Generalized











Ever Davenport MD Mar 27, 2019 08:14

## 2019-03-28 NOTE — DISCHARGE SUMMARY
Pauline Nelson NP 3/28/19 1128:


Discharge Summary


Discharge Summary


_


DATE OF ADMISSION: 03/23/2019


DATE OF DISCHARGE: 03/27/2019





DISCHARGED BY: Dr. Ever Davenport





CONSULTANT: 


Dr. Marni Cook





BRIEF HOSPITAL COURSE:


Patient is a 23-year-old gentleman, with history of COPD and multiple falls, 

who was brought in by EMS, status post mechanical fall.  He tripped while using 

the walker.  He had surgery to the right hip the previous month.  He complained 

of right hip pain, which was sharp, 10/10.  He was unable to bear weight.





On evaluation at ED, vital signs were stable.


Blood work did not show any leukocytosis, 13, hematocrit 40.  Platelet count of 

72.  Electrolytes were normal.  X-ray of the left hip showed superior pubic 

rami fracture extending into the pubic symphysis.  Right hip x-ray showed the 

hardware in place with no acute fracture.  At the emergency room, ER physician 

discussed case with orthopedic surgeon Dr. Charles.  Per orthopedic 

recommendation, fracture is nonoperative.  Can be managed medically, however 

patient will likely have problems ambulating.  He was then admitted for 

evaluation of hip fracture and for physical therapy.





He was placed on fall precautions.  He was continued on Flomax and Proscar for 

BPH.  He was given SCDs for DVT prophylaxis.  He was given pain management.  He 

was continued on his inhalers for his COPD.





He was given physical therapy.





He was noted to have thrombocytopenia.  Hematologist was consulted.  He claimed 

he had history of cirrhosis in the past.  Patient had thrombocytopenia, 

leukopenia and anemia.  There was no active bleeding.  He was cleared for 

discharge and can have extensive evaluation as outpatient.





He was eventually discharged to [facility] to continue 

rehabilitation.





FINAL DIAGNOSES: 


Left pubic rami fracture status post fall


Thrombocytopenia, leukopenia and anemia


COPD


BPH


Left eye blindness








DISPOSITION: Patient was discharged to a SNF.





DISCHARGE MEDICATIONS: Refer to Discharge Medication List.














I have been assigned to complete a discharge summary on this account, I was not 

involved with the patient's management.





Ever Davenport MD 3/28/19 1354:


Discharge Summary


Discharge Summary


_


Patient long d/w Hem/Onc. Will need to follow up with Hematology











Pauline Nelson NP Mar 28, 2019 11:28


Ever Davenport MD Mar 28, 2019 13:54

## 2019-03-28 NOTE — CONSULTATION
DATE OF CONSULTATION:  03/27/2019

NOTE:  POOR AUDIO



HEMATOLOGY/ONCOLOGY CONSULTATION



CONSULTING PHYSICIAN:  Marni Cook M.D.



REFERRING PHYSICIAN:  Ever Davenport M.D.



REASON FOR CONSULTATION:  Thrombocytopenia and mild leukopenia.



HISTORY OF PRESENT ILLNESS:  The patient is a 73-year-old gentleman with

history of multiple falls.  The patient apparently has had a history of

relatively _____ right hip pain with persistent pain.  The patient

apparently had a mechanical fall, has presented to the hospital, has been

evaluated with _____ comminuted, minimally displaced superior pubic ramus

fracture.  During hospitalization, the patient _____ thrombocytopenia and

mild leukopenia.  Therefore, this Hematology/Oncology consultation has

been requested.  _____ from February of 2019, the patient was also noted

to have platelet count approximately 88,000, currently _____ 73,000.  The

patient does disclose that he has had a history of many years of alcohol

use, however, he quit some years ago.  The patient did apparently has a

history of cirrhosis _____ significantly improved.  The patient continues

to have significant bruising.  The patient has had extensive amount of

weight loss over the past many years.  The patient did have a history of

myocardial infarction apparently at age 45 per his report.  The patient

has a longstanding history of tobacco use and continues to smoke.



PAST MEDICAL HISTORY:

1. COPD.

2. History of tobacco use.

3. History of left eye blindness as a child, status post inoculation and

artificial eye placement in left eye.

4. History of myocardial infarction per his history at age 45.

5. History of obesity.

6. History of weight loss.

7. History of bruising.

8. History of thrombocytopenia.

9. History of leukopenia.

10. History of multiple falls.

11. History of right hip fracture, post temitope placement.

12. History of recent comminuted fracture as noted above.



PAST SURGICAL HISTORY:  None.



SOCIAL HISTORY:  The patient has extensive history of tobacco use.  The

patient has had extensive history of alcohol abuse, which he has quit 9

years ago.



FAMILY HISTORY:  The patient denies any history of cancers in the family

whatsoever.



SOCIAL HISTORY:  As noted above.



REVIEW OF SYSTEMS:  CONSTITUTIONAL:  The patient denies any headaches.

PULMONARY:  The patient denies shortness of breath.  The patient has had

COPD.  CARDIAC:  The patient denies chest pain _____.  GASTROINTESTINAL:

The patient denies any abdominal pain.  NEUROLOGIC:  Nonfocal at this

time.  MUSCULOSKELETAL:  The patient is complaining of severe left hip

pain.



PHYSICAL EXAMINATION:

GENERAL:  The patient is a very _____ gentleman, who is extensively bruised

in upper extremities.

VITAL SIGNS:  Temperature 98 degrees, pulse 73, breathing at 20, O2

saturation 96%.

HEAD AND NECK:  Left eye is artificial.  Right eye appears _____

anicteric.

CHEST:  Distant breath sounds.

CARDIAC:  Regular rhythm.

ABDOMEN:  Soft, nontender, and nondistended.  Evidence of significant

weight loss in the abdominal fat region.

EXTREMITIES:  There is no edema.

SKIN:  There is significant extensive bruising.

MUSCULOSKELETAL:  There is evidence of right hip postsurgical changes.

There is left hip tenderness.



LABORATORY AND DIAGNOSTIC DATA:  Laboratory as well as investigation

studies did demonstrate white count of 4 as of today, hemoglobin 10.7 with

a platelet count of 63,000.  The patient has approximately 13% monocytes

with 5% eosinophils.  The patient _____.  The patient was noted to have

thrombocytopenia at that point as well.  The patient's PTT is noted to be

_____, otherwise the patient _____ protein of 7.7, albumin of 3.3 with

sodium of 134.  The patient's hip x-ray has been noted from 03/23/2019

demonstrated, however, left communicated, minimally displaced inferior

pubic ramus fracture, which extends into the symphysis pubis.

Cross-sectional imaging was recommended by radiologist.



ASSESSMENT AND PLAN:

1. Thrombocytopenia, leukopenia, and anemia.  The patient has had a noted

history of thrombocytopenia in the past year.  The patient apparently has

had a known history of cirrhosis in the past _____ not clear at this point

in time.  The patient has been told that his cirrhosis significantly has

improved since he stopped drinking many years ago.  The patient was noted

to have full pancytopenia evaluation.  He _____ to go home.  In view of

the fact, the patient has had thrombocytopenia in the past, clearly the

patient needs to have workup.  However, if he wants to be discharged, we

can plan on extensive outpatient evaluation.  The patient _____ B12,

folate, _____ hepatitis B, C, A as well as HIV needs to be evaluated.

This can all be done as an outpatient basis.  If the patient wishes _____,

then he wants to do that.  The patient is aware of possibility of

underlying malignancies especially _____06:09 cachexia.  _____ history of

smoking with the patient's CT scan of chest _____ lung cancer screening.

2. Left eye blindness.

3. History of questionable cirrhosis.  The patient is at high-risk of

liver cancer.  Abdominal imaging is recommended.

4. Post hip fracture.  The patient _____ MRI or CT scan to evaluate the

extent of the damage to his left hip.

5. Right knee pain.  The patient does have a _____ the patient is to

follow up with his orthopedic surgeon.

6. Bruising, etiology unclear _____ not clear.  The patient's PT and PTT

evaluated.  If persists, the _____ evaluated as an outpatient basis.









  ______________________________________________

  Marni Cook M.D.





DR:  SULY

D:  03/27/2019 08:57

T:  03/28/2019 03:11

JOB#:  [id number]

CC:

## 2019-09-25 ENCOUNTER — HOSPITAL ENCOUNTER (EMERGENCY)
Dept: HOSPITAL 72 - EMR | Age: 74
Discharge: HOME | End: 2019-09-25
Payer: MEDICARE

## 2019-09-25 VITALS — DIASTOLIC BLOOD PRESSURE: 72 MMHG | SYSTOLIC BLOOD PRESSURE: 118 MMHG

## 2019-09-25 VITALS — BODY MASS INDEX: 17.48 KG/M2 | HEIGHT: 69 IN | WEIGHT: 118 LBS

## 2019-09-25 VITALS — DIASTOLIC BLOOD PRESSURE: 77 MMHG | SYSTOLIC BLOOD PRESSURE: 121 MMHG

## 2019-09-25 DIAGNOSIS — I10: ICD-10-CM

## 2019-09-25 DIAGNOSIS — Z99.81: ICD-10-CM

## 2019-09-25 DIAGNOSIS — Z88.1: ICD-10-CM

## 2019-09-25 DIAGNOSIS — J18.9: ICD-10-CM

## 2019-09-25 DIAGNOSIS — Z79.51: ICD-10-CM

## 2019-09-25 DIAGNOSIS — N40.0: ICD-10-CM

## 2019-09-25 DIAGNOSIS — H54.62: ICD-10-CM

## 2019-09-25 DIAGNOSIS — J44.0: Primary | ICD-10-CM

## 2019-09-25 DIAGNOSIS — Z72.0: ICD-10-CM

## 2019-09-25 PROCEDURE — 99284 EMERGENCY DEPT VISIT MOD MDM: CPT

## 2019-09-25 PROCEDURE — 71046 X-RAY EXAM CHEST 2 VIEWS: CPT

## 2019-09-25 PROCEDURE — 94640 AIRWAY INHALATION TREATMENT: CPT

## 2019-09-25 NOTE — NUR
ED Nurse Note:

Patient walked into ED c/o SOB for the past 3 days, states that he usually has 
a nedubilizer at home but have been unable to receive those treatments due to 
the lack of tubing, patient does have a history  of COPD and Asthma. o2 sat at 
97% on room air. will wait for further orders

## 2019-09-25 NOTE — EMERGENCY ROOM REPORT
History of Present Illness


General


Chief Complaint:  Dyspnea/Respdistress


Source:  Patient





Present Illness


[facility]


Disclaimer: Please note that this report is being documented using DRAGON 

technology. This can lead to erroneous entry secondary to incorrect 

interpretation by the dictating instrument.





HPI: 73-year-old male with a history of asthma and COPD who uses oxygen at 

night presents for evaluation of shortness of breath and medication refill.  He 

treats his COPD with albuterol inhaler, nebulizer treatments at home and Advair 

Diskus.  He ran out of his albuterol inhaler and no longer has the tubing for 

his nebulizer machine.  He has been feeling somewhat short of breath for 

several days.  Notes a slight cough but seems to be within his baseline.  

Denies any respiratory distress, fevers, nasal congestion, sore throat, chest 

pain, abdominal pain, vomiting, diarrhea or any other changes in his health.  

No recent admissions for shortness of breath or COPD exacerbation.  Otherwise 

in his usual state of health.  Continues to smoke.


 


PMH: Asthma, COPD, blind in the left eye, BPH


 


PSH: See chart


 


Allergies: Tetracycline skin rash


 


Social Hx: Continues to smoke, occasional alcohol use.  Denies drug use


Allergies:  


Coded Allergies:  


     TETRACYCLINE (Unverified  Allergy, Unknown, 3/23/19)





Nursing Documentation-PMH


Past Medical History:  No History, Except For


Hx Cardiac Problems:  Yes


Hx Hypertension:  Yes


Hx Asthma:  Yes


Hx COPD:  Yes


Hx Cancer:  No


Hx Gastrointestinal Problems:  No


Hx Neurological Problems:  No





Review of Systems


All Other Systems:  negative except mentioned in HPI





Physical Exam





Vital Signs








  Date Time  Temp Pulse Resp B/P (MAP) Pulse Ox O2 Delivery O2 Flow Rate FiO2


 


9/25/19 13:27 97.9 85 22 121/77 (92) 89 Room Air  





 





General: Awake and alert, no acute distress


HEENT: NC/AT.  No tenderness over the frontal or maxillary sinuses.  EOMI. left 

eye is abducted.  Poor dentition.  MMM


Cardiovascular: RRR.  S1 and S2 normal.  No murmur appreciated


Resp: Normal work of breathing.  Intermittent cough.  Scattered expiratory 

wheezes in the lower lung fields.  No crackles.


Abdomen: Abdomen is soft, nondistended.  Nontender


Skin: Intact.  No abrasions, laceration or rash over the exposed skin


MSK: Normal tone and bulk. Moving all extremities.  No obvious deformity.


Neuro: Awake and alert.  Mentating appropriately.





Medical Decision Making


Diagnostic Impression:  


 Primary Impression:  


 COPD (chronic obstructive pulmonary disease)


 Additional Impressions:  


 Medication refill


 Pneumonia


ER Course


73-year-old male with history of asthma and COPD who ran out of his albuterol 

and nebulizer treatments last week presents for evaluation of several days 

shortness of breath.  Denies any new cough but states he has his chronic cough 

and feels tight in the chest but has not had any respiratory distress.  He is 

breathing easily, vital signs are within normal limits, continues to use his 

Advair.  We will give him a DuoNeb treatment in the emergency department and 

get an x-ray to rule out any infiltrates.  Do not see an indication for blood 

work at this time as the patient is overall very well-appearing and is mainly 

requesting medication refill.  If symptomatically improved and there are no 

abnormalities on x-ray he may be discharged home to follow-up with his PMD.  

Will provide tubing for his nebulizer machine.  If he remains symptomatic can 

advance work-up as needed


Chest X-Ray Diagnostic Results


Chest X-Ray Diagnostic Results :  


   Chest X-Ray Ordered:  Yes


   # of Views/Limited/Complete:  2 View


   Indication:  Shortness of Breath


   EP Interpretation:  Yes


   Interpretation:  no consolidation, no effusion, no pneumothorax, no acute 

cardiopulmonary disease


   Impression:  No acute disease


   Electronically Signed by:  Electronically signed by Dr. Chris Ruiz


Reevaluation Time:  15:19





Last Vital Signs








  Date Time  Temp Pulse Resp B/P (MAP) Pulse Ox O2 Delivery O2 Flow Rate FiO2


 


9/25/19 13:30 97.9 86 22 121/77 97 Room Air  








Status:  improved


Reevaluation Impression


Wheezing is now resolved.  Patient states he is feeling much better.  Chest x-

ray does not show an obvious consolidation though there may be some atelectasis 

in the left lower lobe.  We will treat the patient with antibiotics 

prophylactically for 1 week, start steroids and I have refilled his 

medications.  Patient was provided with tubing for his nebulizer and refills of 

his albuterol, Advair, nebulizer treatments.  He will follow-up with his PMD 

next week.  Discussed reasons to return to the emergency department.  He 

understands and agrees with treatment plan.


Disposition:  HOME, SELF-CARE


Condition:  Stable


Scripts


Amoxicillin/Potassium Clav 875-125* (AUGMENTIN 875-125 TABLET*) 1 Each Tablet


1 TAB ORAL TWICE A DAY for 7 Days, #14 TAB


   Prov: Chris Ruiz MD         9/25/19 


Fluticasone/Salmeterol (Advair 100-50 Diskus) 1 Each Blst.w.dev


1 PUFF INH EVERY 12 HOURS for 30 Days, #60 EA


   Prov: Chris Ruiz MD         9/25/19 


Prednisone* (PREDNISONE*) 20 Mg Tablet


40 MG ORAL DAILY for 5 Days, #10 TAB


   Prov: Chris Ruiz MD         9/25/19 


Albuterol Sulfate* (ALBUTEROL SULFATE HHN*) 2.5 Mg/3 Ml Vial.neb


2.5 MG HHN Q4H PRN for Shortness of Breath, #50 VIAL


   Prov: Chris Ruiz MD         9/25/19 


Albuterol Sulfate* (ALBUTEROL SULFATE MDI*) 8.5 Gm Hfa.aer.ad


2 PUFF INH Q4H PRN for cough/wheezing, #1 EA 0 Refills


   Prov: Chris Ruiz MD         9/25/19











Chris Ruiz MD Sep 25, 2019 13:55

## 2019-09-25 NOTE — DIAGNOSTIC IMAGING REPORT
Indication: Cough

 

Technique: 2 views of the chest

 

Comparison: 2/21/2018

 

Findings: There is some patchy infiltrate in the left lung near the cardiac apex and

left midlung. The remainder the lungs and pleural spaces are clear. The heart size is

normal. The aorta is calcified.

 

Impression: Patchy left lung infiltrates, may indicate focal pneumonia. Correlate

with clinical findings

## 2019-10-06 ENCOUNTER — HOSPITAL ENCOUNTER (EMERGENCY)
Dept: HOSPITAL 72 - EMR | Age: 74
Discharge: HOME | End: 2019-10-06
Payer: MEDICARE

## 2019-10-06 VITALS — DIASTOLIC BLOOD PRESSURE: 70 MMHG | SYSTOLIC BLOOD PRESSURE: 115 MMHG

## 2019-10-06 VITALS — WEIGHT: 120 LBS | HEIGHT: 69 IN | BODY MASS INDEX: 17.77 KG/M2

## 2019-10-06 VITALS — SYSTOLIC BLOOD PRESSURE: 110 MMHG | DIASTOLIC BLOOD PRESSURE: 70 MMHG

## 2019-10-06 DIAGNOSIS — I10: ICD-10-CM

## 2019-10-06 DIAGNOSIS — J44.9: ICD-10-CM

## 2019-10-06 DIAGNOSIS — S42.201A: Primary | ICD-10-CM
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
## 2019-10-06 DIAGNOSIS — S01.81XA: ICD-10-CM

## 2019-10-06 DIAGNOSIS — Z88.1: ICD-10-CM
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
## 2019-10-06 DIAGNOSIS — F17.200: ICD-10-CM

## 2019-10-06 DIAGNOSIS — M25.512: ICD-10-CM

## 2019-10-06 PROCEDURE — 73030 X-RAY EXAM OF SHOULDER: CPT

## 2019-10-06 PROCEDURE — 96372 THER/PROPH/DIAG INJ SC/IM: CPT

## 2019-10-06 PROCEDURE — 99284 EMERGENCY DEPT VISIT MOD MDM: CPT

## 2019-10-06 NOTE — EMERGENCY ROOM REPORT
History of Present Illness


General


Chief Complaint:  Multiple Trauma/Fall





Present Illness


HPI


Patient tripped and fell.  He had both of his forearms.  He cut his left 

forearm and also has pain in his right shoulder.  The numbness in the hand on 

the right-hand side.   Is right-handed








Patient fell last week.  He had a laceration on his forehead.  He had a CT scan 

done at that time and says it was normal.  He did not sustain injury to his 

head today.


Allergies:  


Coded Allergies:  


     TETRACYCLINE (Unverified  Allergy, Unknown, 3/23/19)





Patient History


Social History:  Reports: smoking, alcohol use


Social History Narrative


board and care


Reviewed Nursing Documentation:  PMH: Agreed; PSxH: Agreed





Nursing Documentation-PMH


Hx Cardiac Problems:  Yes


Hx Hypertension:  Yes


Hx Asthma:  Yes


Hx COPD:  Yes


Hx Cancer:  No


Hx Gastrointestinal Problems:  No


Hx Neurological Problems:  No





Physical Exam





Vital Signs








  Date Time  Temp Pulse Resp B/P (MAP) Pulse Ox O2 Delivery O2 Flow Rate FiO2


 


10/6/19 20:17 97.5 95 16 107/70 (82) 90 Room Air  











Medical Decision Making


Diagnostic Impression:  


 Primary Impression:  


 Proximal humeral fracture


 Additional Impressions:  


 Alcohol abuse


 Skin tear


Status:  improved


Disposition:  ASSISTED LIVING - board and care


Condition:  Improved











Steve Ware MD Oct 6, 2019 22:47

## 2019-10-06 NOTE — NUR
ED Nurse Note:



pt brought from home for S/P fall with skin tear to right elbow area. pt c/o 
pain to right shoulder and elbow area. pt denies taking blood thinners.

## 2019-10-06 NOTE — NUR
ER DISCHARGE NOTE:

Patient is cleared to be discharged per ERMD, pt is aox4, on room air, with 
stable vital signs. pt was given dc and prescription instructions, pt was able 
to verbalize understanding, pt id band removed without complications.  pt took 
all belongings. taxi was called and pt is wating in waiting room at this time.

## 2019-10-07 NOTE — DIAGNOSTIC IMAGING REPORT
Indication: Trauma, pain

 

Technique: 3 views of the right shoulder

 

Comparison: none

 

Findings: There is an angulated and slightly distracted fracture of the right humeral

neck. The joint spaces are preserved. The bones appear osteoporotic.

 

Impression: Positive for right humeral neck fracture

## 2019-10-07 NOTE — DIAGNOSTIC IMAGING REPORT
Indication: Pain, status post fall

 

Technique: 3 views of the left shoulder

 

Comparison: none

 

Findings: No acute fractures. No dislocations. The joint spaces are preserved

 

Impression: Negative

## 2024-01-11 NOTE — GENERAL PROGRESS NOTE
Patient ID: Varsha Rg is a 70 y.o. female.    Chief Complaint: swollen glands  (Glands on right and side of neck are swollen, causing some pan also has concerns of depression and anxiety meds)    70-year-old female here today for requested visit as a Dr. Vogt patient.  Today presents with exacerbated anxiety/depression.  Symptom onset greater than 6 months.  Per patient, exacerbated with loss of her father.  Also with caretaker role strain 90-year-old mother.  Reports episodes of feeling down/crying most days of the week.  Denies suicidal or homicidal ideations.  Currently taking Klonopin 4 mg nightly with mild improvement only.  Discussed initiating pharmacologic intervention with SSRI, for which patient was in agreeance to.  Also with complaints of sore throat with some left sided lymphadenopathy.  Apparently had some fever chills cough congestion 10 days ago.  Symptoms onset gradually improving.  Only remaining symptom and some mild sore throat.  Subsequently encouraged to treat symptomatically.  Otherwise no other acute medical concerns noted.        MEDICAL HISTORY:    Past Medical History:   Diagnosis Date    Anxiety and depression 01/11/2024    Arthritis     Benign paroxysmal vertigo, bilateral     Chronic back pain     Concussion     DVT (deep venous thrombosis)     Fibromyalgia     GERD (gastroesophageal reflux disease)     History of syncope     Hypercholesteremia     Hypertension     IBS (irritable bowel syndrome)     Lymphedema     Osteoporosis     Personal history of colonic polyps 08/04/2015    Skin cancer     SOB (shortness of breath)     Systemic lupus erythematosus, organ or system involvement unspecified     Thyroid disease     Unspecified cataract     Unspecified osteoarthritis, unspecified site       Past Surgical History:   Procedure Laterality Date    APPENDECTOMY      CARPAL TUNNEL RELEASE      CATARACT EXTRACTION Right 06/14/2022    CATARACT EXTRACTION Left 06/28/2022     Assessment/Plan


Assessment/Plan


1. Thrombocytopenia.  Okay to continue with surgery. 


--> Okay to continue Lovenox daily for prophylaxis.  


---> Negative hepatitis panel and human immunodeficiency virus.


--> Improving. 


2. Anemia due to underlying chronic disease.  


--> Continue to closely monitor.  


--> Anemia workup completed.  Results: Iron 80, TIBC 245, Folate 47.5, B12 441


--> Hemoglobin low, goal is >7


3. Coagulopathy, potentially secondary to liver disease.  


--> S/P ab ultrasound, was suboptimal and revealed no significant results 


4. Anemia due to orthopedic procedure, currently downtrended, potentially 

secondary to hemodilution.  


--> Again, continue to closely monitor.  


--> Does not need iron at this time. 


5. Syncope, probably due to anemia versus vertigo versus other causes.


6. Tobacco use.


7. Alcoholic liver disease.


8. History of cirrhosis.





Subjective


Date patient seen:  Feb 24, 2018


Constitutional:  Denies: no symptoms, chills, diaphoresis, fever, malaise, 

weakness, other


HEENT:  Denies: no symptoms, eye pain, blurred vision, tearing, double vision, 

ear pain, ear discharge, nose pain, nose congestion, throat pain, throat 

swelling, mouth pain, mouth swelling, other


Cardiovascular:  Denies: no symptoms, chest pain, edema, irregular heart rate, 

lightheadedness, palpitations, syncope, other


Respiratory:  Denies: no symptoms, cough, orthopnea, shortness of breath, SOB 

with excertion, SOB at rest, sputum, stridor, wheezing, other


Gastrointestinal/Abdominal:  Denies: no symptoms, abdomen distended, abdominal 

pain, black stools, tarry stools, blood in stool, constipated, diarrhea, 

difficulty swallowing, nausea, poor appetite, poor fluid intake, rectal bleeding

, vomiting, other


Genitourinary:  Denies: no symptoms, burning, discharge, frequency, flank pain, 

hematuria, incontinence, pain, urgency, other


Hematologic/Lymphatic:  Reports: anemia


Allergies:  


Coded Allergies:  


     No Known Allergies (Unverified , 2/21/18)


Subjective


S/P right ORIF. Hemoglobin dropped to 7.2. Platelet count improving.





Objective





Last 24 Hour Vital Signs








  Date Time  Temp Pulse Resp B/P (MAP) Pulse Ox O2 Delivery O2 Flow Rate FiO2


 


2/25/18 12:00 98.1 88 20 106/52 93 Nasal Cannula 3.0 





 98.1       


 


2/25/18 08:00 99.3 95 20 115/51 98 Nasal Cannula 3.0 





 99.3       


 


2/25/18 07:15  94 20  99 Nasal Cannula 3.0 32


 


2/25/18 07:05  88 18  95 Nasal Cannula 3.0 32


 


2/25/18 07:00     95 Nasal Cannula 3.0 32


 


2/25/18 07:00      Nasal Cannula 3.0 32


 


2/25/18 04:00  89      


 


2/25/18 04:00 97.0 89 20 111/50 91 Nasal Cannula 3.0 





 97.0       


 


2/25/18 01:31      Nasal Cannula 3.0 32


 


2/25/18 01:30      Nasal Cannula 3.0 32


 


2/25/18 00:00 98.1 92 20 114/69 96 Nasal Cannula 3.0 





 98.1       


 


2/25/18 00:00  92      


 


2/24/18 20:00  93      


 


2/24/18 20:00 98.7 85 20 109/74 97 Nasal Cannula 3.0 





 98.7       


 


2/24/18 19:52      Nasal Cannula 3.0 32


 


2/24/18 19:51      Nasal Cannula 3.0 32


 


2/24/18 19:51      Nasal Cannula 3.0 32


 


2/24/18 19:51     98 Nasal Cannula 3.0 32


 


2/24/18 17:57 98.2       


 


2/24/18 16:58 98.4       


 


2/24/18 16:00  93      


 


2/24/18 16:00 98.8 92 20 104/56 99 Nasal Cannula 3.0 





 98.8       


 


2/24/18 14:56  97 18 98/51 98 Nasal Cannula 3.0 


 


2/24/18 12:58  92 20  99 Nasal Cannula 3.0 32


 


2/24/18 12:47  90 18  98 Nasal Cannula 3.0 32


 


2/24/18 12:00 98.5 98 17 99/47 98 Nasal Cannula 3.0 





 98.5       


 


2/24/18 10:30 97.9 98  88/47    





 97.9       


 


2/24/18 10:01 98.3       


 


2/24/18 08:37 98.3       


 


2/24/18 08:00 98.3 99 18 91/55 98 Nasal Cannula 3.0 





 98.3       


 


2/24/18 07:26  90 18  99 Nasal Cannula 3.0 32


 


2/24/18 07:20   20 84/53 97 Nasal Cannula 3.0 


 


2/24/18 07:16      Nasal Cannula 3.0 32


 


2/24/18 07:16     99 Nasal Cannula 3.0 32


 


2/24/18 07:16  91 16  99 Nasal Cannula 3.0 32


 


2/24/18 03:24 97.8 103 16 105/51 98 Nasal Cannula 3.0 





 97.8       


 


2/24/18 01:48  99 18  99 Nasal Cannula 3.0 32


 


2/24/18 01:41  102 16  98 Nasal Cannula 3.0 32


 


2/24/18 00:16 98.0 103 17 99/51 95 Nasal Cannula 3.0 





 98.0       


 


2/23/18 20:16  97 18  99 Nasal Cannula 3.0 32


 


2/23/18 20:06  98 16  98 Nasal Cannula 3.0 32


 


2/23/18 20:05     98 Nasal Cannula 3.0 32


 


2/23/18 20:05      Nasal Cannula 3.0 32


 


2/23/18 19:52 97.4 96 18 100/46 98 Nasal Cannula 3.0 





 97.4       


 


2/23/18 18:39 98.8 96 21 102/59 97   





 98.8       


 


2/23/18 18:16 99.5 95 20 108/40 98 Nasal Cannula 3.0 





 99.5       


 


2/23/18 18:09  92 20 96/40 98 Nasal Cannula 3.0 


 


2/23/18 17:50  87 20 93/53 98 Nasal Cannula 3.0 


 


2/23/18 17:45  93 20 88/58 96 Nasal Cannula 3.0 


 


2/23/18 17:30  92 20 118/66 98 Nasal Cannula 3.0 


 


2/23/18 17:13  87 20 90/42 98 Nasal Cannula 3.0 


 


2/23/18 17:00  82 20 85/41 99 Nasal Cannula 3.0 


 


2/23/18 16:45  82 20 90/41 100 Simple Mask 8.0 


 


2/23/18 16:30  82 20 92/41 100 Simple Mask 8.0 


 


2/23/18 16:15  82 20 100/41 100 Simple Mask 8.0 


 


2/23/18 16:00  81 20 99/51 100 Simple Mask 8.0 


 


2/23/18 15:55  79 20 98/41 100 Simple Mask 8.0 


 


2/23/18 15:45  79 20 99/42 100 Simple Mask 8.0 


 


2/23/18 15:40 97.5 80 20 100/49 100 Simple Mask 8.0 





 97.5       








Last 24 Hour Vital Signs








  Date Time  Temp Pulse Resp B/P (MAP) Pulse Ox O2 Delivery O2 Flow Rate FiO2


 


2/25/18 12:00 98.1 88 20 106/52 93 Nasal Cannula 3.0 





 98.1       


 


2/25/18 08:00 99.3 95 20 115/51 98 Nasal Cannula 3.0 





 99.3       


 


2/25/18 07:15  94 20  99 Nasal Cannula 3.0 32


 


2/25/18 07:05  88 18  95 Nasal Cannula 3.0 32


 


2/25/18 07:00     95 Nasal Cannula 3.0 32


 


2/25/18 07:00      Nasal Cannula 3.0 32


 


2/25/18 04:00  89      


 


2/25/18 04:00 97.0 89 20 111/50 91 Nasal Cannula 3.0 





 97.0       


 


2/25/18 01:31      Nasal Cannula 3.0 32


 


2/25/18 01:30      Nasal Cannula 3.0 32


 


2/25/18 00:00 98.1 92 20 114/69 96 Nasal Cannula 3.0 





 98.1       


 


2/25/18 00:00  92      


 


2/24/18 20:00  93      


 


2/24/18 20:00 98.7 85 20 109/74 97 Nasal Cannula 3.0 





 98.7       


 


2/24/18 19:52      Nasal Cannula 3.0 32


 


2/24/18 19:51      Nasal Cannula 3.0 32


 


2/24/18 19:51      Nasal Cannula 3.0 32


 


2/24/18 19:51     98 Nasal Cannula 3.0 32


 


2/24/18 17:57 98.2       


 


2/24/18 16:58 98.4       


 


2/24/18 16:00  93      


 


2/24/18 16:00 98.8 92 20 104/56 99 Nasal Cannula 3.0 





 98.8       


 


2/24/18 14:56  97 18 98/51 98 Nasal Cannula 3.0 


 


2/24/18 12:58  92 20  99 Nasal Cannula 3.0 32


 


2/24/18 12:47  90 18  98 Nasal Cannula 3.0 32

















Intake and Output  


 


 2/24/18 2/25/18





 19:00 07:00


 


Intake Total 450 ml 28 ml


 


Output Total 600 ml 120 ml


 


Balance -150 ml -92 ml


 


  


 


IV Total 450 ml 28 ml


 


Output Urine Total 600 ml 120 ml


 


# Voids  1











Labs








Test


  2/22/18


12:30 2/23/18


06:50 2/24/18


06:40 2/25/18


06:50


 


Troponin I


  0.004 ng/mL


(0.000-0.056) 


  


  


 


 


White Blood Count


  


  8.0 K/UL


(4.8-10.8) 8.6 K/UL


(4.8-10.8) 8.7 K/UL


(4.8-10.8)


 


Red Blood Count


  


  2.86 M/UL


(4.70-6.10) 2.05 M/UL


(4.70-6.10) 2.23 M/UL


(4.70-6.10)


 


Hemoglobin


  


  9.9 G/DL


(14.2-18.0) 7.2 G/DL


(14.2-18.0) 7.7 G/DL


(14.2-18.0)


 


Hematocrit


  


  28.5 %


(42.0-52.0) 20.4 %


(42.0-52.0) 21.8 %


(42.0-52.0)


 


Mean Corpuscular Volume  100 FL (80-99)  100 FL (80-99)  98 FL (80-99) 


 


Mean Corpuscular Hemoglobin


  


  34.8 PG


(27.0-31.0) 34.9 PG


(27.0-31.0) 34.7 PG


(27.0-31.0)


 


Mean Corpuscular Hemoglobin


Concent 


  34.9 G/DL


(32.0-36.0) 35.1 G/DL


(32.0-36.0) 35.6 G/DL


(32.0-36.0)


 


Red Cell Distribution Width


  


  12.6 %


(11.6-14.8) 12.3 %


(11.6-14.8) 14.0 %


(11.6-14.8)


 


Platelet Count


  


  88 K/UL


(150-450) 92 K/UL


(150-450) 115 K/UL


(150-450)


 


Mean Platelet Volume


  


  5.9 FL


(6.5-10.1) 6.0 FL


(6.5-10.1) 5.4 FL


(6.5-10.1)


 


Neutrophils (%) (Auto)   % (45.0-75.0)   % (45.0-75.0)   % (45.0-75.0) 


 


Lymphocytes (%) (Auto)   % (20.0-45.0)   % (20.0-45.0)   % (20.0-45.0) 


 


Monocytes (%) (Auto)   % (1.0-10.0)   % (1.0-10.0)   % (1.0-10.0) 


 


Eosinophils (%) (Auto)   % (0.0-3.0)   % (0.0-3.0)   % (0.0-3.0) 


 


Basophils (%) (Auto)   % (0.0-2.0)   % (0.0-2.0)   % (0.0-2.0) 


 


Differential Total Cells


Counted 


  100 


 


 


Neutrophils % (Manual)  56 % (45-75)  60 % (45-75)  63 % (45-75) 


 


Lymphocytes % (Manual)  26 % (20-45)  25 % (20-45)  27 % (20-45) 


 


Monocytes % (Manual)  17 % (1-10)  12 % (1-10)  1 % (1-10) 


 


Eosinophils % (Manual)  1 % (0-3)  3 % (0-3)  9 % (0-3) 


 


Basophils % (Manual)  0 % (0-2)  0 % (0-2)  0 % (0-2) 


 


Band Neutrophils  0 % (0-8)  0 % (0-8)  0 % (0-8) 


 


Platelet Estimate  Decreased  Decreased  Decreased 


 


Platelet Morphology  Normal  Normal  Normal 


 


Hypochromasia  2+  1+  1+ 


 


Spherocytes  1+   


 


Reticulocyte Count


  


  1.0 %


(0.0-2.0) 


  


 


 


Sodium Level


  


  135 MMOL/L


(136-145) 136 MMOL/L


(136-145) 137 MMOL/L


(136-145)


 


Potassium Level


  


  4.8 MMOL/L


(3.5-5.1) 4.4 MMOL/L


(3.5-5.1) 4.1 MMOL/L


(3.5-5.1)


 


Chloride Level


  


  104 MMOL/L


() 106 MMOL/L


() 108 MMOL/L


()


 


Carbon Dioxide Level


  


  25 MMOL/L


(21-32) 23 MMOL/L


(21-32) 22 MMOL/L


(21-32)


 


Anion Gap


  


  6 mmol/L


(5-15) 7 mmol/L


(5-15) 7 mmol/L


(5-15)


 


Blood Urea Nitrogen


  


  29 mg/dL


(7-18) 25 mg/dL


(7-18) 22 mg/dL


(7-18)


 


Creatinine


  


  1.1 MG/DL


(0.55-1.30) 1.0 MG/DL


(0.55-1.30) 0.9 MG/DL


(0.55-1.30)


 


Estimat Glomerular Filtration


Rate 


   mL/min (>60) 


 


 


Glucose Level


  


  107 MG/DL


() 124 MG/DL


() 107 MG/DL


()


 


Calcium Level


  


  8.9 MG/DL


(8.5-10.1) 8.1 MG/DL


(8.5-10.1) 8.3 MG/DL


(8.5-10.1)


 


Iron Level


  


  80 ug/dL


() 


  


 


 


Total Iron Binding Capacity


  


  245 ug/dL


(250-450) 


  


 


 


Percent Iron Saturation  33 % (15-50)   


 


Unsaturated Iron Binding


  


  165 ug/dL


(112-346) 


  


 


 


Ferritin


  


  209 NG/ML


(8-388) 


  


 


 


Folate


  


  47.5 NG/ML


(8.6-58.9) 


  


 


 


Hepatitis A IgM Antibody


  


  Negative


(Negative) 


  


 


 


Hepatitis B Surface Antigen


  


  Negative


(Negative) 


  


 


 


Hepatitis B Core IgM Antibody


  


  Negative


(Negative) 


  


 


 


Hepatitis C Antibody


  


  0.1 s/co ratio


(0.0-0.9) 


  


 


 


HIV (1&2) Antibody Rapid


  


  Negative


(NEGATIVE) 


  


 


 


Macrocytosis   1+  


 


Phosphorus Level


  


  


  2.7 MG/DL


(2.5-4.9) 


 


 


Magnesium Level


  


  


  1.9 MG/DL


(1.8-2.4) 


 


 


Polychromasia    1+ 


 


Anisocytosis    1+ 


 


Total Bilirubin


  


  


  


  0.9 MG/DL


(0.2-1.0)


 


Aspartate Amino Transf


(AST/SGOT) 


  


  


  34 U/L (15-37) 


 


 


Alanine Aminotransferase


(ALT/SGPT) 


  


  


  13 U/L (12-78) 


 


 


Alkaline Phosphatase


  


  


  


  81 U/L


()


 


Total Protein


  


  


  


  5.7 G/DL


(6.4-8.2)


 


Albumin


  


  


  


  2.3 G/DL


(3.4-5.0)


 


Globulin    3.4 g/dL 


 


Albumin/Globulin Ratio    0.7 (1.0-2.7) 





Laboratory Tests


2/25/18 06:50: 


White Blood Count 8.7, Red Blood Count 2.23L, Hemoglobin 7.7L, Hematocrit 21.8L

, Mean Corpuscular Volume 98, Mean Corpuscular Hemoglobin 34.7H, Mean 

Corpuscular Hemoglobin Concent 35.6, Red Cell Distribution Width 14.0, Platelet 

Count 115L, Mean Platelet Volume 5.4L, Neutrophils (%) (Auto) , Lymphocytes (%) 

(Auto) , Monocytes (%) (Auto) , Eosinophils (%) (Auto) , Basophils (%) (Auto) , 

Differential Total Cells Counted 100, Neutrophils % (Manual) 63, Lymphocytes % (

Manual) 27, Monocytes % (Manual) 1, Eosinophils % (Manual) 9H, Basophils % (

Manual) 0, Band Neutrophils 0, Platelet Estimate DecreasedL, Platelet 

Morphology Normal, Polychromasia 1+, Hypochromasia 1+, Anisocytosis 1+, Sodium 

Level 137, Potassium Level 4.1, Chloride Level 108H, Carbon Dioxide Level 22, 

Anion Gap 7, Blood Urea Nitrogen 22H, Creatinine 0.9, Estimat Glomerular 

Filtration Rate , Glucose Level 107H, Calcium Level 8.3L, Total Bilirubin 0.9, 

Aspartate Amino Transf (AST/SGOT) 34, Alanine Aminotransferase (ALT/SGPT) 13, 

Alkaline Phosphatase 81, Total Protein 5.7L, Albumin 2.3L, Globulin 3.4, Albumin

/Globulin Ratio 0.7L


Height (Feet):  5


Height (Inches):  6.00


Weight (Pounds):  135











Gautam Beaulieu Feb 25, 2018 12:29  SECTION      CHOLECYSTECTOMY      COLONOSCOPY  2015    EXCISION OF SQUAMOUS CELL CARCINOMA      FOOT FRACTURE SURGERY      HIATAL HERNIA REPAIR  2022    Dr. elmore    HYSTERECTOMY      KNEE SURGERY      LAPAROSCOPIC SLEEVE GASTRECTOMY  2022    Dr. Elmore    RECONSTRUCTION OF SHOULDER      TONSILLECTOMY      TRIGGER FINGER RELEASE      WRIST FRACTURE SURGERY        Social History     Tobacco Use    Smoking status: Never    Smokeless tobacco: Never   Substance Use Topics    Alcohol use: Not Currently    Drug use: Never          Health Maintenance Due   Topic Date Due    Hepatitis C Screening  Never done    TETANUS VACCINE  Never done    Shingles Vaccine (1 of 2) Never done    RSV Vaccine (Age 60+ and Pregnant patients) (1 - 1-dose 60+ series) Never done    Pneumococcal Vaccines (Age 65+) (1 - PCV) Never done    COVID-19 Vaccine (6 - -24 season) 2023    Mammogram  2024          Patient Care Team:  Amrit Vogt II, MD as PCP - General (Internal Medicine)  Nomi Olivares MD as Consulting Physician (Gastroenterology)  Jake Elmore MD as Surgeon (Bariatrics)  Armando Jacques MD (Cardiovascular Disease)  Blanco Parnell MD (Orthopedic Surgery)  Dana Trinh (Inactive)      Review of Systems   Constitutional:  Negative for fatigue and fever.   HENT:  Positive for sore throat. Negative for congestion, rhinorrhea and trouble swallowing.    Eyes:  Negative for redness and visual disturbance.   Respiratory:  Negative for cough, chest tightness and shortness of breath.    Cardiovascular:  Negative for chest pain and palpitations.   Gastrointestinal:  Negative for abdominal pain, constipation, diarrhea, nausea and vomiting.   Genitourinary:  Negative for dysuria, flank pain, frequency and urgency.   Musculoskeletal:  Negative for arthralgias, gait problem and myalgias.   Skin:  Negative for rash and wound.   Neurological:  Negative for facial asymmetry, speech  difficulty, weakness and headaches.   Psychiatric/Behavioral:  Positive for dysphoric mood. The patient is nervous/anxious.    All other systems reviewed and are negative.      Objective:   /72 (BP Location: Left arm, Patient Position: Sitting, BP Method: Small (Automatic))   Pulse 87   Temp 98.6 °F (37 °C) (Temporal)   Resp 18   Wt 81.9 kg (180 lb 9.6 oz)   SpO2 98%   BMI 35.27 kg/m²      Physical Exam  Constitutional:       General: She is not in acute distress.     Appearance: Normal appearance.   HENT:      Right Ear: Tympanic membrane, ear canal and external ear normal.      Left Ear: Tympanic membrane, ear canal and external ear normal.      Nose: Nose normal.      Mouth/Throat:      Mouth: Mucous membranes are moist.      Pharynx: Oropharynx is clear.   Eyes:      Extraocular Movements: Extraocular movements intact.      Conjunctiva/sclera: Conjunctivae normal.      Pupils: Pupils are equal, round, and reactive to light.   Cardiovascular:      Rate and Rhythm: Normal rate and regular rhythm.      Pulses: Normal pulses.      Heart sounds: Normal heart sounds. No murmur heard.     No gallop.   Pulmonary:      Effort: Pulmonary effort is normal.      Breath sounds: Normal breath sounds. No wheezing.   Abdominal:      General: Bowel sounds are normal. There is no distension.      Palpations: Abdomen is soft. There is no mass.      Tenderness: There is no abdominal tenderness. There is no guarding.   Musculoskeletal:         General: Normal range of motion.   Skin:     General: Skin is warm and dry.   Neurological:      Mental Status: She is alert. Mental status is at baseline.      Sensory: No sensory deficit.      Motor: No weakness.   Psychiatric:         Attention and Perception: Attention normal.         Mood and Affect: Mood is depressed. Affect is tearful.         Speech: Speech normal.         Behavior: Behavior normal.           Assessment:       ICD-10-CM ICD-9-CM   1. Anxiety and depression   F41.9 300.00    F32.A 311   2. Recent URI  J06.9 465.9        Plan:     Problem List Items Addressed This Visit          Psychiatric    Anxiety and depression - Primary (Chronic)     -acute on chronic  -associated with loss father and caretaker role strain  -currently on Klonopin 4 mg nightly, continue  -initiate trial on Prozac 20 mg daily trial   -notify clinic for new or worsening symptoms   -allow 4-6 weeks for medication take effect   -do not abruptly stop medication         Relevant Medications    FLUoxetine 20 MG capsule       ENT    Recent URI     -approximately 10 days ago   -symptoms gradually improving and currently mild presentation  -encouraged to utilize OTC antihistamine of  -Tylenol/ibuprofen for low-grade temps and body aches   -OTC cough syrup as indicated   -notify clinic for new or worsening symptoms               Follow up in about 2 months (around 3/11/2024) for with Dr. Vogt, Medication Evaluation.   -plan specifics discussed above    Orders Placed This Encounter    FLUoxetine 20 MG capsule        Medication List with Changes/Refills   New Medications    FLUOXETINE 20 MG CAPSULE    Take 1 capsule (20 mg total) by mouth nightly.   Current Medications    CALCIUM CITRATE/VITAMIN D3 (CITRACAL + D ORAL)    Take by mouth.    CLONAZEPAM (KLONOPIN) 2 MG TAB    TAKE TWO TABLETS BY MOUTH AT BEDTIME    COLLAGEN MISC    by Misc.(Non-Drug; Combo Route) route.    DICYCLOMINE (BENTYL) 10 MG CAPSULE    Take 10 mg by mouth 3 (three) times daily.    ERGOCALCIFEROL (ERGOCALCIFEROL) 50,000 UNIT CAP    Take 1 capsule (50,000 Units total) by mouth every 7 days.    FUROSEMIDE (LASIX) 20 MG TABLET    Take 20 mg by mouth daily as needed.    IRBESARTAN (AVAPRO) 75 MG TABLET    Take 75 mg by mouth.    MECLIZINE (ANTIVERT) 12.5 MG TABLET    Take 12.5 mg by mouth 3 (three) times daily as needed.    MULTIVIT-MINERALS/FOLIC ACID (CENTRUM ADULTS ORAL)    Take by mouth.    PANTOPRAZOLE (PROTONIX) 40 MG TABLET    TAKE ONE  TABLET BY MOUTH DAILY    PROTEIN SUPPLEMENT (PROTEIN ORAL)    Take by mouth.    UNABLE TO FIND    Sugarbear vitamins